# Patient Record
Sex: MALE | Employment: OTHER | URBAN - METROPOLITAN AREA
[De-identification: names, ages, dates, MRNs, and addresses within clinical notes are randomized per-mention and may not be internally consistent; named-entity substitution may affect disease eponyms.]

---

## 2017-12-15 ENCOUNTER — OFFICE VISIT (OUTPATIENT)
Dept: FAMILY MEDICINE CLINIC | Age: 82
End: 2017-12-15

## 2017-12-15 VITALS
RESPIRATION RATE: 18 BRPM | WEIGHT: 241 LBS | HEART RATE: 81 BPM | DIASTOLIC BLOOD PRESSURE: 100 MMHG | HEIGHT: 70 IN | BODY MASS INDEX: 34.5 KG/M2 | OXYGEN SATURATION: 99 % | SYSTOLIC BLOOD PRESSURE: 190 MMHG

## 2017-12-15 DIAGNOSIS — N40.1 BENIGN PROSTATIC HYPERPLASIA WITH LOWER URINARY TRACT SYMPTOMS, SYMPTOM DETAILS UNSPECIFIED: ICD-10-CM

## 2017-12-15 DIAGNOSIS — J44.9 MODERATE COPD (CHRONIC OBSTRUCTIVE PULMONARY DISEASE) (HCC): Primary | ICD-10-CM

## 2017-12-15 DIAGNOSIS — I10 ESSENTIAL HYPERTENSION: ICD-10-CM

## 2017-12-15 DIAGNOSIS — F51.01 PRIMARY INSOMNIA: ICD-10-CM

## 2017-12-15 PROCEDURE — 1123F ACP DISCUSS/DSCN MKR DOCD: CPT | Performed by: FAMILY MEDICINE

## 2017-12-15 PROCEDURE — G8427 DOCREV CUR MEDS BY ELIG CLIN: HCPCS | Performed by: FAMILY MEDICINE

## 2017-12-15 PROCEDURE — G8926 SPIRO NO PERF OR DOC: HCPCS | Performed by: FAMILY MEDICINE

## 2017-12-15 PROCEDURE — 3023F SPIROM DOC REV: CPT | Performed by: FAMILY MEDICINE

## 2017-12-15 PROCEDURE — 99214 OFFICE O/P EST MOD 30 MIN: CPT | Performed by: FAMILY MEDICINE

## 2017-12-15 PROCEDURE — 1036F TOBACCO NON-USER: CPT | Performed by: FAMILY MEDICINE

## 2017-12-15 PROCEDURE — G8417 CALC BMI ABV UP PARAM F/U: HCPCS | Performed by: FAMILY MEDICINE

## 2017-12-15 PROCEDURE — G8484 FLU IMMUNIZE NO ADMIN: HCPCS | Performed by: FAMILY MEDICINE

## 2017-12-15 PROCEDURE — 4040F PNEUMOC VAC/ADMIN/RCVD: CPT | Performed by: FAMILY MEDICINE

## 2017-12-15 RX ORDER — TRAZODONE HYDROCHLORIDE 50 MG/1
50 TABLET ORAL NIGHTLY
Qty: 90 TABLET | Refills: 1 | Status: CANCELLED | OUTPATIENT
Start: 2017-12-15

## 2017-12-15 RX ORDER — FINASTERIDE 5 MG/1
5 TABLET, FILM COATED ORAL DAILY
Qty: 90 TABLET | Refills: 3 | Status: CANCELLED | OUTPATIENT
Start: 2017-12-15

## 2017-12-15 RX ORDER — TRIAMTERENE AND HYDROCHLOROTHIAZIDE 37.5; 25 MG/1; MG/1
1 TABLET ORAL EVERY MORNING
Qty: 90 TABLET | Refills: 3 | Status: CANCELLED | OUTPATIENT
Start: 2017-12-15

## 2017-12-15 RX ORDER — TAMSULOSIN HYDROCHLORIDE 0.4 MG/1
0.8 CAPSULE ORAL NIGHTLY
Qty: 60 CAPSULE | Refills: 5 | Status: SHIPPED | OUTPATIENT
Start: 2017-12-15 | End: 2018-06-25 | Stop reason: SDUPTHER

## 2017-12-15 RX ORDER — FUROSEMIDE 20 MG/1
20 TABLET ORAL 2 TIMES DAILY
Qty: 60 TABLET | Refills: 3 | Status: SHIPPED | OUTPATIENT
Start: 2017-12-15 | End: 2018-06-25 | Stop reason: SDUPTHER

## 2017-12-15 ASSESSMENT — PATIENT HEALTH QUESTIONNAIRE - PHQ9
SUM OF ALL RESPONSES TO PHQ9 QUESTIONS 1 & 2: 1
1. LITTLE INTEREST OR PLEASURE IN DOING THINGS: 0
SUM OF ALL RESPONSES TO PHQ QUESTIONS 1-9: 1
2. FEELING DOWN, DEPRESSED OR HOPELESS: 1

## 2017-12-19 NOTE — PROGRESS NOTES
Diagnosis Assessment and Associated Orders:     1. Moderate COPD (chronic obstructive pulmonary disease) ([address] - suspected moderate to severe. Patient has been off his inhalers  umeclidinium-vilanterol (ANORO ELLIPTA) 62.5-25 MCG/INH AEPB inhaler   2. Benign prostatic hyperplasia with lower urinary tract symptoms, symptom details unspecified  tamsulosin (FLOMAX) 0.4 MG capsule   3. Essential hypertension - elevated due to non compliance with medication and also has white coat hypertension. Currently denies any CNS or cardiac issues. furosemide (LASIX) 20 MG tablet    Comprehensive Metabolic Panel   4. Primary insomnia - suspected to long standing untreated depression that patient has declines treatment in the past. Will discuss at next visit and consider trazodone vs melatonin. All patient questions answered. Pt voiced understanding. Return for 1 week iterpreter for labs, 4 week extended with me HTN BPH, COPD.    ----------------------------------------------------------------------------          Pt is here for:     Chief Complaint   Patient presents with    Arthritis     all over, pain     Other     prostates issues, constant pain    Testicle Pain    Shortness of Breath     always out of breath, increasing worse, chest discomfort stomach up    Insomnia    Discuss Medications     Stop medications     Neck Pain   reprots worsening GERD and flaring of his COPD. States ADvair did not help with his breathing in the past and hasn't seen an providers in the last 2 years. Not checking his blood pressure regularly and ongoing fatigue and occasional headaches The patient denies any symptoms of neurological impairment or TIA's; no amaurosis, diplopia, dysphasia, or unilateral disturbance of motor or sensory function. No loss of balance or vertigo. Ongoing chornic urinary dribbling and had declines urology intervention it the past. Appetite stable. Lives alone.     Does not speak [place]  and gets help Normal appearance and bowel sounds are normal.   Neurological: He  is alert and oriented to person, place, and time. no CN deficits. Skin: He  is not diaphoretic. Psychiatric: He  has a normal mood and affect. MSK: venous stasis changes to both lower shins.   2+ pitting edema     Assessment and Plan:  See above

## 2017-12-22 ENCOUNTER — NURSE ONLY (OUTPATIENT)
Dept: FAMILY MEDICINE CLINIC | Age: 82
End: 2017-12-22

## 2017-12-22 DIAGNOSIS — I10 ESSENTIAL HYPERTENSION: ICD-10-CM

## 2017-12-22 LAB
A/G RATIO: 1.6 (ref 1.1–2.2)
ALBUMIN SERPL-MCNC: 4.3 G/DL (ref 3.4–5)
ALP BLD-CCNC: 67 U/L (ref 40–129)
ALT SERPL-CCNC: 20 U/L (ref 10–40)
ANION GAP SERPL CALCULATED.3IONS-SCNC: 11 MMOL/L (ref 3–16)
AST SERPL-CCNC: 23 U/L (ref 15–37)
BILIRUB SERPL-MCNC: 0.4 MG/DL (ref 0–1)
BUN BLDV-MCNC: 25 MG/DL (ref 7–20)
CALCIUM SERPL-MCNC: 9.5 MG/DL (ref 8.3–10.6)
CHLORIDE BLD-SCNC: 101 MMOL/L (ref 99–110)
CO2: 29 MMOL/L (ref 21–32)
CREAT SERPL-MCNC: 1 MG/DL (ref 0.8–1.3)
GFR AFRICAN AMERICAN: >60
GFR NON-AFRICAN AMERICAN: >60
GLOBULIN: 2.7 G/DL
GLUCOSE BLD-MCNC: 109 MG/DL (ref 70–99)
POTASSIUM SERPL-SCNC: 4.2 MMOL/L (ref 3.5–5.1)
SODIUM BLD-SCNC: 141 MMOL/L (ref 136–145)
TOTAL PROTEIN: 7 G/DL (ref 6.4–8.2)

## 2017-12-22 PROCEDURE — 36415 COLL VENOUS BLD VENIPUNCTURE: CPT | Performed by: FAMILY MEDICINE

## 2017-12-26 ENCOUNTER — TELEPHONE (OUTPATIENT)
Dept: FAMILY MEDICINE CLINIC | Age: 82
End: 2017-12-26

## 2017-12-27 NOTE — TELEPHONE ENCOUNTER
Notify patient's son that his lab work was overall unremarkable with normal kidney function, liver function tests, and electrolytes.

## 2018-01-08 ENCOUNTER — OFFICE VISIT (OUTPATIENT)
Dept: FAMILY MEDICINE CLINIC | Age: 83
End: 2018-01-08

## 2018-01-08 VITALS
WEIGHT: 243.6 LBS | HEIGHT: 70 IN | HEART RATE: 81 BPM | SYSTOLIC BLOOD PRESSURE: 162 MMHG | RESPIRATION RATE: 18 BRPM | BODY MASS INDEX: 34.88 KG/M2 | DIASTOLIC BLOOD PRESSURE: 94 MMHG | OXYGEN SATURATION: 97 %

## 2018-01-08 DIAGNOSIS — N40.1 BENIGN PROSTATIC HYPERPLASIA WITH LOWER URINARY TRACT SYMPTOMS, SYMPTOM DETAILS UNSPECIFIED: ICD-10-CM

## 2018-01-08 DIAGNOSIS — I10 ESSENTIAL HYPERTENSION: ICD-10-CM

## 2018-01-08 DIAGNOSIS — J44.9 MODERATE COPD (CHRONIC OBSTRUCTIVE PULMONARY DISEASE) (HCC): Primary | ICD-10-CM

## 2018-01-08 PROCEDURE — 4040F PNEUMOC VAC/ADMIN/RCVD: CPT | Performed by: FAMILY MEDICINE

## 2018-01-08 PROCEDURE — G8926 SPIRO NO PERF OR DOC: HCPCS | Performed by: FAMILY MEDICINE

## 2018-01-08 PROCEDURE — G8484 FLU IMMUNIZE NO ADMIN: HCPCS | Performed by: FAMILY MEDICINE

## 2018-01-08 PROCEDURE — G8427 DOCREV CUR MEDS BY ELIG CLIN: HCPCS | Performed by: FAMILY MEDICINE

## 2018-01-08 PROCEDURE — 3023F SPIROM DOC REV: CPT | Performed by: FAMILY MEDICINE

## 2018-01-08 PROCEDURE — 1036F TOBACCO NON-USER: CPT | Performed by: FAMILY MEDICINE

## 2018-01-08 PROCEDURE — G8417 CALC BMI ABV UP PARAM F/U: HCPCS | Performed by: FAMILY MEDICINE

## 2018-01-08 PROCEDURE — 99213 OFFICE O/P EST LOW 20 MIN: CPT | Performed by: FAMILY MEDICINE

## 2018-01-08 PROCEDURE — 1123F ACP DISCUSS/DSCN MKR DOCD: CPT | Performed by: FAMILY MEDICINE

## 2018-01-08 RX ORDER — AMLODIPINE BESYLATE 5 MG/1
5 TABLET ORAL DAILY
Qty: 30 TABLET | Refills: 11 | Status: SHIPPED | OUTPATIENT
Start: 2018-01-08 | End: 2018-09-04 | Stop reason: ALTCHOICE

## 2018-01-08 RX ORDER — FINASTERIDE 5 MG/1
5 TABLET, FILM COATED ORAL DAILY
Qty: 30 TABLET | Refills: 11 | Status: SHIPPED | OUTPATIENT
Start: 2018-01-08 | End: 2019-01-10 | Stop reason: SDUPTHER

## 2018-02-14 ENCOUNTER — TELEPHONE (OUTPATIENT)
Dept: FAMILY MEDICINE CLINIC | Age: 83
End: 2018-02-14

## 2018-02-14 DIAGNOSIS — J44.9 MODERATE COPD (CHRONIC OBSTRUCTIVE PULMONARY DISEASE) (HCC): Primary | ICD-10-CM

## 2018-02-14 NOTE — TELEPHONE ENCOUNTER
Spoke to son who states pts breathing has got worse recently even with using inhaler. Pt will schedule appt with [facility] since provider is out of office. Dr Bhavani Baez office will call pt to schedule appt. Pts son is inquiring about what should be done about prostate medication not working with urnation.  Please advise

## 2018-02-20 ENCOUNTER — TELEPHONE (OUTPATIENT)
Dept: FAMILY MEDICINE CLINIC | Age: 83
End: 2018-02-20

## 2018-02-28 ENCOUNTER — HOSPITAL ENCOUNTER (OUTPATIENT)
Dept: GENERAL RADIOLOGY | Age: 83
Discharge: HOME | End: 2018-02-28
Attending: INTERNAL MEDICINE | Admitting: INTERNAL MEDICINE

## 2018-02-28 ENCOUNTER — INITIAL CONSULT (OUTPATIENT)
Dept: PULMONOLOGY | Age: 83
End: 2018-02-28

## 2018-02-28 VITALS
WEIGHT: 243 LBS | HEIGHT: 72 IN | OXYGEN SATURATION: 94 % | SYSTOLIC BLOOD PRESSURE: 140 MMHG | BODY MASS INDEX: 32.91 KG/M2 | HEART RATE: 86 BPM | DIASTOLIC BLOOD PRESSURE: 78 MMHG

## 2018-02-28 DIAGNOSIS — J44.9 COPD, MILD (HCC): ICD-10-CM

## 2018-02-28 DIAGNOSIS — Z87.891 FORMER SMOKER: ICD-10-CM

## 2018-02-28 LAB
DLCO %PRED: NORMAL
DLCO PRE: NORMAL
FEF 25-75%-POST: 1.42
FEF 25-75%-PRE: 1.69
FEV1-POST: 1.99
FEV1-PRE: 2.25
FEV1/FVC-POST: 61.3
FEV1/FVC-PRE: 69.3
FVC-POST: 3.25
FVC-PRE: 3.25
MEP: NORMAL
MIP: NORMAL
TLC %PRED: NORMAL
TLC PRE: NORMAL

## 2018-02-28 PROCEDURE — 1123F ACP DISCUSS/DSCN MKR DOCD: CPT | Performed by: INTERNAL MEDICINE

## 2018-02-28 PROCEDURE — G8427 DOCREV CUR MEDS BY ELIG CLIN: HCPCS | Performed by: INTERNAL MEDICINE

## 2018-02-28 PROCEDURE — G8484 FLU IMMUNIZE NO ADMIN: HCPCS | Performed by: INTERNAL MEDICINE

## 2018-02-28 PROCEDURE — 3023F SPIROM DOC REV: CPT | Performed by: INTERNAL MEDICINE

## 2018-02-28 PROCEDURE — 1036F TOBACCO NON-USER: CPT | Performed by: INTERNAL MEDICINE

## 2018-02-28 PROCEDURE — 99203 OFFICE O/P NEW LOW 30 MIN: CPT | Performed by: INTERNAL MEDICINE

## 2018-02-28 PROCEDURE — G8926 SPIRO NO PERF OR DOC: HCPCS | Performed by: INTERNAL MEDICINE

## 2018-02-28 PROCEDURE — 4040F PNEUMOC VAC/ADMIN/RCVD: CPT | Performed by: INTERNAL MEDICINE

## 2018-02-28 PROCEDURE — G8417 CALC BMI ABV UP PARAM F/U: HCPCS | Performed by: INTERNAL MEDICINE

## 2018-02-28 ASSESSMENT — PULMONARY FUNCTION TESTS
FVC_POST: 3.25
FEV1/FVC_PRE: 69.3
FEV1_POST: 1.99
FEV1/FVC_POST: 61.3
FEV1_PRE: 2.25
FVC_PRE: 3.25

## 2018-02-28 NOTE — PROGRESS NOTES
Subjective:   CHIEF COMPLAINT / HPI: Miguel Mccarthy is a 36-year-old male, [place]-speaking and originally from [facility], who presents with a history of COPD and shortness of breath with exertion. According to his son this is effecting his sleep and the current bronchodilator he is using is ineffective. Using an  Mr. Mendez Gutierrez denies marked shortness of breath and states that he can walk to the parking lot without being short of breath and can walk a flight of stairs. He does complain of fatigue after exertion but not shortness of breath. He was a previous a 2 pack-a-day smoker, who states that he smoked 65 years and quit almost 10 years ago. He does not give a history of recurrent episodes of bronchitis but does cough and expectorate a small amount of sputum daily. He denies chest pain and denies a history of heart disease. He does complain of insomnia and awakening frequently at nighttime but does not have a history of witnessed apnea, awakening snorting and gasping for air or excessive daytime sleepiness. He does complain of pain all over. He states that he can sneeze up to 30 times a day but does not have a history of allergic rhinitis and denies any allergies. He is not aware of a family history of chronic lung disease, asthma or lung cancer.          Past Medical History:  Past Medical History:   Diagnosis Date    BPH     Mod BPH 40 gm prostate- Cystourethroscopy 4/2010- Dr. Monse Key COPD (chronic obstructive pulmonary disease) ([facility]     PFT 10/09 3.21 (98%) / 4.97 (115%)=0.65    COPD, mild ([address] 2/28/2018    Depression     Pt has refused Rx in the past    DJD (degenerative joint disease), lumbar     CT scan 3/29/2010    Former smoker 2/28/2018    Hypertension 1996    Insomnia     Kidney stones     CT scan 3/29/2010    Liver cyst     CT scan 3/29/2010    Lumbar disc disease 8/2012    Dr. Nicola Perkins, will refer to Dr. Hanh Alfonso for eval    Lumbar stenosis     CT scan 3/29/2010   Elie Angel Nocturia     Orchitis and epididymitis, unspecified 8/2012    Dr. Leighann Bland    Pain in left testicle     Pancreatic cyst     CT scan 3/29/2010    Spinal stenosis 8/2012    severe-Dr. Berhane Yan Suicide attempt 3/2005    drug overdose with  Zestoretic    Tachycardia     \"saw Dr , not sure of his name, did tests and did not find anything\"\"that was about 4 months ago\"\"no more episodes since had work up\"    Ulcer ([address]     hx of ulcers\"three years ago but ok now\"(pc)       Current Medications:    No current facility-administered medications for this visit. Allergies   Allergen Reactions    Ace Inhibitors      Angioedema, 8/05/ (on 9/26/12- pt denies any allergic reaction to any medications)       Social History:    Social History     Social History    Marital status: Legally      Spouse name: N/A    Number of children: N/A    Years of education: N/A     Social History Main Topics    Smoking status: Former Smoker     Packs/day: 2.50     Years: 65.00     Quit date: 8/22/2009    Smokeless tobacco: Never Used      Comment: Pt smoked for 65 yrs. Quit 9 yrs ago. Updated 2/28/18    Alcohol use 2.5 oz/week     5 Standard drinks or equivalent per week    Drug use: Unknown    Sexual activity: Not Asked     Other Topics Concern    None     Social History Narrative    None       Family History:    History reviewed. No pertinent family history. REVIEW OF SYSTEMS:    CONSTITUTIONAL:  negative for fevers, chills, diaphoresis, activity change, appetite change, night sweats and unexpected weight change.    HEENT:  negative for hearing loss,  sinus pressure,  epistaxis and snoring  RESPIRATORY:  See HPI  CARDIOVASCULAR:  Negative for chest pain, palpitations, exertional chest pressure/discomfort, edema, syncope  GASTROINTESTINAL: negative for nausea, vomiting, diarrhea, constipation, blood in stool and abdominal pain  GENITOURINARY:  negative for  dysuria and hematuria  HEMATOLOGIC/LYMPHATIC:  negative for easy bruising, bleeding and lymphadenopathy  ALLERGIC/IMMUNOLOGIC:  negative for recurrent infections, angioedema, anaphylaxis and drug reaction  MUSCULOSKELETAL:  negative  joint swelling, decreased range of motion and muscle weakness    Objective:   PHYSICAL EXAM:      VITALS:    Vitals:    02/28/18 1040   BP: (!) 140/78   Site: Right Arm   Pulse: 86   SpO2: 94%   Weight: 243 lb (110.2 kg)   Height: 6' (1.829 m)         CONSTITUTIONAL:  awake, alert, cooperative, no apparent distress, and appears younger than  stated age  NECK:  Supple and nontender,  trachea midline, no adenopathy, thyroid nl, no JVD, no wheezing or stridor over neck  Nasal membranes appear to be normal without purulent discharge or injection or edema  Oral pharynx was clear  CHEST: Chest expansion equal and symmetrical, no intercostal retraction, no increased work of breathing  LUNGS: Breath sounds are clear throughout. I hear no wheezes rales or rhonchi   CARDIOVASCULAR: Normal S1 and S2 , no murmurs or gallops ,no pericardial rubs  ABDOMEN:  normal bowel sounds, non-distended and no masses palpated, and no tenderness to palpation. No hepatospleenomegaly  LYMPHADENOPATHY:  no axillary or supraclavicular adenopathy. No cervical adnenopathy  RIGHT AND LEFT LOWER EXTREMITIES: No edema, no inflammation, no tenderness. RADIOLOGY: No recent chest x-ray available    PFT: Spirometry completed at [facility] on 3/19/15 demonstrated a minimal obstructive defect with a significant an almost asthmatic like response to bronchodilators  Office spirometry today demonstrates a mild obstructive defect with no significant response to bronchodilators    Assessment:     1. Irish speaking patient- requires proper     2. COPD, mild ([address]    3. Former smoker        Plan:   I would like to obtain a more recent chest x-ray. I don't have a good answer for his chronic pain. He appears to have only a mild obstructive defect.   Because he has a history of sneezing and possible allergies along with a marked improvement with bronchodilators in 2015 I would like to switch him from Anoro to Breo 100/25 one inhalation daily and I will teach him how to use a pro-air respiclick 2 inhalations every 4-6 hours as necessary especially if he becomes more short of breath or have any wheezing. I also will place him on nasal steroid for his recurrent sneezing and nasal congestion. All in all he has well preserved lung function after his long history of tobacco use. I will see him back in 6 weeks and follow-up. I have given him samples of Breo and pro-air  Return in about 6 weeks (around 4/11/2018) for Recheck for COPD. This dictation was performed with a verbal recognition program and it was checked for errors. It is possible that there are still dictated errors within this office note. Any errors should be brought immediately to my attention for correction. All efforts were made to ensure that this office note is accurate.

## 2018-03-09 DIAGNOSIS — J44.9 COPD, MILD (HCC): ICD-10-CM

## 2018-03-09 DIAGNOSIS — Z87.891 FORMER SMOKER: ICD-10-CM

## 2018-03-15 ENCOUNTER — TELEPHONE (OUTPATIENT)
Dept: PULMONOLOGY | Age: 83
End: 2018-03-15

## 2018-03-16 ENCOUNTER — TELEPHONE (OUTPATIENT)
Dept: PULMONOLOGY | Age: 83
End: 2018-03-16

## 2018-03-21 ENCOUNTER — TELEPHONE (OUTPATIENT)
Dept: PULMONOLOGY | Age: 83
End: 2018-03-21

## 2018-03-21 DIAGNOSIS — R06.02 SHORTNESS OF BREATH: Primary | ICD-10-CM

## 2018-04-03 ENCOUNTER — OFFICE VISIT (OUTPATIENT)
Dept: FAMILY MEDICINE CLINIC | Age: 83
End: 2018-04-03

## 2018-04-03 VITALS
DIASTOLIC BLOOD PRESSURE: 68 MMHG | OXYGEN SATURATION: 97 % | HEART RATE: 87 BPM | SYSTOLIC BLOOD PRESSURE: 116 MMHG | BODY MASS INDEX: 33.31 KG/M2 | WEIGHT: 245.6 LBS

## 2018-04-03 DIAGNOSIS — J44.9 COPD, MILD (HCC): Primary | ICD-10-CM

## 2018-04-03 DIAGNOSIS — R06.02 SOB (SHORTNESS OF BREATH): ICD-10-CM

## 2018-04-03 DIAGNOSIS — G89.29 OTHER CHRONIC PAIN: ICD-10-CM

## 2018-04-03 DIAGNOSIS — I10 ESSENTIAL HYPERTENSION: ICD-10-CM

## 2018-04-03 DIAGNOSIS — Z23 NEED FOR PNEUMOCOCCAL VACCINATION: ICD-10-CM

## 2018-04-03 LAB
ANION GAP SERPL CALCULATED.3IONS-SCNC: 15 MMOL/L (ref 3–16)
BUN BLDV-MCNC: 24 MG/DL (ref 7–20)
CALCIUM SERPL-MCNC: 9.5 MG/DL (ref 8.3–10.6)
CHLORIDE BLD-SCNC: 97 MMOL/L (ref 99–110)
CO2: 28 MMOL/L (ref 21–32)
CREAT SERPL-MCNC: 1.1 MG/DL (ref 0.8–1.3)
D DIMER: <200 NG/ML DDU (ref 0–229)
GFR AFRICAN AMERICAN: >60
GFR NON-AFRICAN AMERICAN: >60
GLUCOSE BLD-MCNC: 77 MG/DL (ref 70–99)
HCT VFR BLD CALC: 46.3 % (ref 40.5–52.5)
HEMOGLOBIN: 15.7 G/DL (ref 13.5–17.5)
MCH RBC QN AUTO: 32.2 PG (ref 26–34)
MCHC RBC AUTO-ENTMCNC: 34 G/DL (ref 31–36)
MCV RBC AUTO: 94.9 FL (ref 80–100)
PDW BLD-RTO: 13.6 % (ref 12.4–15.4)
PLATELET # BLD: 256 K/UL (ref 135–450)
PMV BLD AUTO: 9.2 FL (ref 5–10.5)
POTASSIUM SERPL-SCNC: 5.1 MMOL/L (ref 3.5–5.1)
PRO-BNP: 218 PG/ML (ref 0–449)
RBC # BLD: 4.88 M/UL (ref 4.2–5.9)
SODIUM BLD-SCNC: 140 MMOL/L (ref 136–145)
TSH SERPL DL<=0.05 MIU/L-ACNC: 5.27 UIU/ML (ref 0.27–4.2)
WBC # BLD: 8.2 K/UL (ref 4–11)

## 2018-04-03 PROCEDURE — G8926 SPIRO NO PERF OR DOC: HCPCS | Performed by: FAMILY MEDICINE

## 2018-04-03 PROCEDURE — 4040F PNEUMOC VAC/ADMIN/RCVD: CPT | Performed by: FAMILY MEDICINE

## 2018-04-03 PROCEDURE — G8427 DOCREV CUR MEDS BY ELIG CLIN: HCPCS | Performed by: FAMILY MEDICINE

## 2018-04-03 PROCEDURE — 1036F TOBACCO NON-USER: CPT | Performed by: FAMILY MEDICINE

## 2018-04-03 PROCEDURE — G8417 CALC BMI ABV UP PARAM F/U: HCPCS | Performed by: FAMILY MEDICINE

## 2018-04-03 PROCEDURE — 3023F SPIROM DOC REV: CPT | Performed by: FAMILY MEDICINE

## 2018-04-03 PROCEDURE — 1123F ACP DISCUSS/DSCN MKR DOCD: CPT | Performed by: FAMILY MEDICINE

## 2018-04-03 PROCEDURE — 99214 OFFICE O/P EST MOD 30 MIN: CPT | Performed by: FAMILY MEDICINE

## 2018-04-03 PROCEDURE — G0009 ADMIN PNEUMOCOCCAL VACCINE: HCPCS | Performed by: FAMILY MEDICINE

## 2018-04-03 PROCEDURE — 90670 PCV13 VACCINE IM: CPT | Performed by: FAMILY MEDICINE

## 2018-04-03 PROCEDURE — 36415 COLL VENOUS BLD VENIPUNCTURE: CPT | Performed by: FAMILY MEDICINE

## 2018-04-03 RX ORDER — DULOXETIN HYDROCHLORIDE 20 MG/1
20 CAPSULE, DELAYED RELEASE ORAL NIGHTLY
Qty: 90 CAPSULE | Refills: 1 | Status: SHIPPED | OUTPATIENT
Start: 2018-04-03 | End: 2018-09-04 | Stop reason: ALTCHOICE

## 2018-04-06 PROBLEM — I51.9 MILD DIASTOLIC DYSFUNCTION: Status: ACTIVE | Noted: 2018-04-06

## 2018-04-10 ENCOUNTER — OFFICE VISIT (OUTPATIENT)
Dept: PULMONOLOGY | Age: 83
End: 2018-04-10

## 2018-04-10 ENCOUNTER — TELEPHONE (OUTPATIENT)
Dept: ORTHOPEDIC SURGERY | Age: 83
End: 2018-04-10

## 2018-04-10 VITALS
OXYGEN SATURATION: 86 % | DIASTOLIC BLOOD PRESSURE: 68 MMHG | WEIGHT: 246 LBS | RESPIRATION RATE: 16 BRPM | BODY MASS INDEX: 33.36 KG/M2 | HEART RATE: 79 BPM | SYSTOLIC BLOOD PRESSURE: 126 MMHG

## 2018-04-10 DIAGNOSIS — R06.09 DYSPNEA ON EXERTION: ICD-10-CM

## 2018-04-10 DIAGNOSIS — Z87.891 FORMER SMOKER: ICD-10-CM

## 2018-04-10 DIAGNOSIS — J44.9 COPD, MILD (HCC): Primary | ICD-10-CM

## 2018-04-10 PROCEDURE — G8427 DOCREV CUR MEDS BY ELIG CLIN: HCPCS | Performed by: INTERNAL MEDICINE

## 2018-04-10 PROCEDURE — 99213 OFFICE O/P EST LOW 20 MIN: CPT | Performed by: INTERNAL MEDICINE

## 2018-04-10 PROCEDURE — 4040F PNEUMOC VAC/ADMIN/RCVD: CPT | Performed by: INTERNAL MEDICINE

## 2018-04-10 PROCEDURE — G8926 SPIRO NO PERF OR DOC: HCPCS | Performed by: INTERNAL MEDICINE

## 2018-04-10 PROCEDURE — 1123F ACP DISCUSS/DSCN MKR DOCD: CPT | Performed by: INTERNAL MEDICINE

## 2018-04-10 PROCEDURE — 1036F TOBACCO NON-USER: CPT | Performed by: INTERNAL MEDICINE

## 2018-04-10 PROCEDURE — G8417 CALC BMI ABV UP PARAM F/U: HCPCS | Performed by: INTERNAL MEDICINE

## 2018-04-10 PROCEDURE — 3023F SPIROM DOC REV: CPT | Performed by: INTERNAL MEDICINE

## 2018-04-24 ENCOUNTER — TELEPHONE (OUTPATIENT)
Dept: FAMILY MEDICINE CLINIC | Age: 83
End: 2018-04-24

## 2018-04-24 DIAGNOSIS — R06.09 DOE (DYSPNEA ON EXERTION): Primary | ICD-10-CM

## 2018-04-24 DIAGNOSIS — I51.9 MILD DIASTOLIC DYSFUNCTION: ICD-10-CM

## 2018-04-24 DIAGNOSIS — I10 ESSENTIAL HYPERTENSION: ICD-10-CM

## 2018-04-26 ENCOUNTER — TELEPHONE (OUTPATIENT)
Dept: FAMILY MEDICINE CLINIC | Age: 83
End: 2018-04-26

## 2018-04-30 ENCOUNTER — INITIAL CONSULT (OUTPATIENT)
Dept: CARDIOLOGY CLINIC | Age: 83
End: 2018-04-30

## 2018-04-30 VITALS
BODY MASS INDEX: 33.08 KG/M2 | HEIGHT: 72 IN | HEART RATE: 89 BPM | WEIGHT: 244.2 LBS | SYSTOLIC BLOOD PRESSURE: 130 MMHG | DIASTOLIC BLOOD PRESSURE: 84 MMHG

## 2018-04-30 DIAGNOSIS — R06.09 DYSPNEA ON EXERTION: ICD-10-CM

## 2018-04-30 DIAGNOSIS — Z87.891 FORMER SMOKER: ICD-10-CM

## 2018-04-30 DIAGNOSIS — E78.5 DYSLIPIDEMIA: ICD-10-CM

## 2018-04-30 DIAGNOSIS — R07.9 CHEST PAIN, UNSPECIFIED TYPE: Primary | ICD-10-CM

## 2018-04-30 DIAGNOSIS — I10 ESSENTIAL HYPERTENSION: ICD-10-CM

## 2018-04-30 DIAGNOSIS — R07.2 PRECORDIAL PAIN: ICD-10-CM

## 2018-04-30 PROCEDURE — G8427 DOCREV CUR MEDS BY ELIG CLIN: HCPCS | Performed by: INTERNAL MEDICINE

## 2018-04-30 PROCEDURE — G8417 CALC BMI ABV UP PARAM F/U: HCPCS | Performed by: INTERNAL MEDICINE

## 2018-04-30 PROCEDURE — 99204 OFFICE O/P NEW MOD 45 MIN: CPT | Performed by: INTERNAL MEDICINE

## 2018-04-30 PROCEDURE — 93000 ELECTROCARDIOGRAM COMPLETE: CPT | Performed by: INTERNAL MEDICINE

## 2018-05-08 ENCOUNTER — OFFICE VISIT (OUTPATIENT)
Dept: FAMILY MEDICINE CLINIC | Age: 83
End: 2018-05-08

## 2018-05-08 VITALS
HEART RATE: 89 BPM | WEIGHT: 248.8 LBS | SYSTOLIC BLOOD PRESSURE: 138 MMHG | OXYGEN SATURATION: 96 % | BODY MASS INDEX: 33.74 KG/M2 | DIASTOLIC BLOOD PRESSURE: 64 MMHG

## 2018-05-08 DIAGNOSIS — J44.9 COPD, MILD (HCC): ICD-10-CM

## 2018-05-08 DIAGNOSIS — R07.2 PRECORDIAL PAIN: Primary | ICD-10-CM

## 2018-05-08 PROCEDURE — 1036F TOBACCO NON-USER: CPT | Performed by: FAMILY MEDICINE

## 2018-05-08 PROCEDURE — 4040F PNEUMOC VAC/ADMIN/RCVD: CPT | Performed by: FAMILY MEDICINE

## 2018-05-08 PROCEDURE — 3023F SPIROM DOC REV: CPT | Performed by: FAMILY MEDICINE

## 2018-05-08 PROCEDURE — 1123F ACP DISCUSS/DSCN MKR DOCD: CPT | Performed by: FAMILY MEDICINE

## 2018-05-08 PROCEDURE — G8926 SPIRO NO PERF OR DOC: HCPCS | Performed by: FAMILY MEDICINE

## 2018-05-08 PROCEDURE — 99213 OFFICE O/P EST LOW 20 MIN: CPT | Performed by: FAMILY MEDICINE

## 2018-05-08 PROCEDURE — G8427 DOCREV CUR MEDS BY ELIG CLIN: HCPCS | Performed by: FAMILY MEDICINE

## 2018-05-08 PROCEDURE — G8417 CALC BMI ABV UP PARAM F/U: HCPCS | Performed by: FAMILY MEDICINE

## 2018-05-08 RX ORDER — GUAIFENESIN 600 MG/1
600 TABLET, EXTENDED RELEASE ORAL 2 TIMES DAILY
Qty: 60 TABLET | Refills: 1 | Status: SHIPPED | OUTPATIENT
Start: 2018-05-08 | End: 2018-07-23 | Stop reason: SDUPTHER

## 2018-05-08 RX ORDER — OMEPRAZOLE 40 MG/1
40 CAPSULE, DELAYED RELEASE ORAL EVERY MORNING
Qty: 90 CAPSULE | Refills: 1 | Status: SHIPPED | OUTPATIENT
Start: 2018-05-08 | End: 2018-10-06 | Stop reason: SDUPTHER

## 2018-05-11 ENCOUNTER — PROCEDURE VISIT (OUTPATIENT)
Dept: CARDIOLOGY CLINIC | Age: 83
End: 2018-05-11

## 2018-05-11 DIAGNOSIS — E78.5 DYSLIPIDEMIA: ICD-10-CM

## 2018-05-11 DIAGNOSIS — R06.09 DYSPNEA ON EXERTION: ICD-10-CM

## 2018-05-11 DIAGNOSIS — R07.9 CHEST PAIN, UNSPECIFIED TYPE: ICD-10-CM

## 2018-05-11 DIAGNOSIS — R07.2 PRECORDIAL PAIN: ICD-10-CM

## 2018-05-11 DIAGNOSIS — R06.09 DYSPNEA ON EXERTION: Primary | ICD-10-CM

## 2018-05-11 LAB
LV EF: 52 %
LV EF: 55 %
LVEF MODALITY: NORMAL
LVEF MODALITY: NORMAL

## 2018-05-11 PROCEDURE — 93306 TTE W/DOPPLER COMPLETE: CPT | Performed by: INTERNAL MEDICINE

## 2018-05-11 PROCEDURE — 93016 CV STRESS TEST SUPVJ ONLY: CPT | Performed by: INTERNAL MEDICINE

## 2018-05-11 PROCEDURE — 93017 CV STRESS TEST TRACING ONLY: CPT | Performed by: INTERNAL MEDICINE

## 2018-05-11 PROCEDURE — A9500 TC99M SESTAMIBI: HCPCS | Performed by: INTERNAL MEDICINE

## 2018-05-11 PROCEDURE — 78452 HT MUSCLE IMAGE SPECT MULT: CPT | Performed by: INTERNAL MEDICINE

## 2018-05-11 PROCEDURE — 93018 CV STRESS TEST I&R ONLY: CPT | Performed by: INTERNAL MEDICINE

## 2018-05-15 ENCOUNTER — TELEPHONE (OUTPATIENT)
Dept: CARDIOLOGY CLINIC | Age: 83
End: 2018-05-15

## 2018-05-23 ENCOUNTER — OFFICE VISIT (OUTPATIENT)
Dept: CARDIOLOGY CLINIC | Age: 83
End: 2018-05-23

## 2018-05-23 VITALS
HEIGHT: 72 IN | WEIGHT: 247.6 LBS | BODY MASS INDEX: 33.54 KG/M2 | HEART RATE: 92 BPM | SYSTOLIC BLOOD PRESSURE: 180 MMHG | DIASTOLIC BLOOD PRESSURE: 96 MMHG

## 2018-05-23 DIAGNOSIS — I71.20 THORACIC AORTIC ANEURYSM WITHOUT RUPTURE: ICD-10-CM

## 2018-05-23 DIAGNOSIS — R07.2 PRECORDIAL PAIN: Primary | ICD-10-CM

## 2018-05-23 DIAGNOSIS — E78.5 DYSLIPIDEMIA: ICD-10-CM

## 2018-05-23 DIAGNOSIS — I10 ESSENTIAL HYPERTENSION: ICD-10-CM

## 2018-05-23 PROCEDURE — 4040F PNEUMOC VAC/ADMIN/RCVD: CPT | Performed by: INTERNAL MEDICINE

## 2018-05-23 PROCEDURE — 1123F ACP DISCUSS/DSCN MKR DOCD: CPT | Performed by: INTERNAL MEDICINE

## 2018-05-23 PROCEDURE — G8417 CALC BMI ABV UP PARAM F/U: HCPCS | Performed by: INTERNAL MEDICINE

## 2018-05-23 PROCEDURE — G8427 DOCREV CUR MEDS BY ELIG CLIN: HCPCS | Performed by: INTERNAL MEDICINE

## 2018-05-23 PROCEDURE — 99214 OFFICE O/P EST MOD 30 MIN: CPT | Performed by: INTERNAL MEDICINE

## 2018-05-23 PROCEDURE — 1036F TOBACCO NON-USER: CPT | Performed by: INTERNAL MEDICINE

## 2018-05-23 RX ORDER — NITROGLYCERIN 0.4 MG/1
0.4 TABLET SUBLINGUAL EVERY 5 MIN PRN
Qty: 25 TABLET | Refills: 3 | Status: SHIPPED | OUTPATIENT
Start: 2018-05-23 | End: 2018-07-23 | Stop reason: SDUPTHER

## 2018-05-23 RX ORDER — CARVEDILOL 6.25 MG/1
6.25 TABLET ORAL 2 TIMES DAILY WITH MEALS
Qty: 60 TABLET | Refills: 3 | Status: SHIPPED | OUTPATIENT
Start: 2018-05-23 | End: 2018-07-15

## 2018-05-24 ENCOUNTER — HOSPITAL ENCOUNTER (OUTPATIENT)
Dept: CT IMAGING | Age: 83
Discharge: HOME | End: 2018-05-24
Attending: FAMILY MEDICINE | Admitting: FAMILY MEDICINE

## 2018-05-24 DIAGNOSIS — R06.09 OTHER FORMS OF DYSPNEA: ICD-10-CM

## 2018-05-24 DIAGNOSIS — R06.09 DOE (DYSPNEA ON EXERTION): ICD-10-CM

## 2018-05-24 LAB
GFR AFRICAN AMERICAN: >60 ML/MIN/1.73M2
GFR NON-AFRICAN AMERICAN: >60 ML/MIN/1.73M2
POC CREATININE: 1 MG/DL (ref 0.9–1.3)

## 2018-05-24 RX ORDER — SODIUM CHLORIDE 0.9 % (FLUSH) 0.9 %
10 SYRINGE (ML) INJECTION ONCE
Status: COMPLETED | OUTPATIENT
Start: 2018-05-24 | End: 2018-05-24

## 2018-05-24 RX ADMIN — Medication 10 ML: at 12:27

## 2018-05-25 ENCOUNTER — TELEPHONE (OUTPATIENT)
Dept: FAMILY MEDICINE CLINIC | Age: 83
End: 2018-05-25

## 2018-06-05 PROBLEM — Z92.89 HX OF CARDIOVASCULAR STRESS TEST: Status: ACTIVE | Noted: 2018-05-11

## 2018-06-08 ENCOUNTER — TELEPHONE (OUTPATIENT)
Dept: CARDIOLOGY CLINIC | Age: 83
End: 2018-06-08

## 2018-06-25 ENCOUNTER — OFFICE VISIT (OUTPATIENT)
Dept: FAMILY MEDICINE CLINIC | Age: 83
End: 2018-06-25

## 2018-06-25 VITALS
DIASTOLIC BLOOD PRESSURE: 88 MMHG | WEIGHT: 247.6 LBS | OXYGEN SATURATION: 97 % | HEART RATE: 92 BPM | SYSTOLIC BLOOD PRESSURE: 148 MMHG | BODY MASS INDEX: 33.58 KG/M2

## 2018-06-25 DIAGNOSIS — N40.1 BENIGN PROSTATIC HYPERPLASIA WITH LOWER URINARY TRACT SYMPTOMS, SYMPTOM DETAILS UNSPECIFIED: ICD-10-CM

## 2018-06-25 DIAGNOSIS — I10 ESSENTIAL HYPERTENSION: ICD-10-CM

## 2018-06-25 DIAGNOSIS — N28.1 RENAL CYST, RIGHT: ICD-10-CM

## 2018-06-25 DIAGNOSIS — R94.39 ABNORMAL CARDIOVASCULAR STRESS TEST: ICD-10-CM

## 2018-06-25 DIAGNOSIS — J44.9 COPD, MILD (HCC): Primary | ICD-10-CM

## 2018-06-25 DIAGNOSIS — R31.9 HEMATURIA, UNSPECIFIED TYPE: ICD-10-CM

## 2018-06-25 PROCEDURE — 1036F TOBACCO NON-USER: CPT | Performed by: FAMILY MEDICINE

## 2018-06-25 PROCEDURE — G8427 DOCREV CUR MEDS BY ELIG CLIN: HCPCS | Performed by: FAMILY MEDICINE

## 2018-06-25 PROCEDURE — G8417 CALC BMI ABV UP PARAM F/U: HCPCS | Performed by: FAMILY MEDICINE

## 2018-06-25 PROCEDURE — 99214 OFFICE O/P EST MOD 30 MIN: CPT | Performed by: FAMILY MEDICINE

## 2018-06-25 PROCEDURE — 3023F SPIROM DOC REV: CPT | Performed by: FAMILY MEDICINE

## 2018-06-25 PROCEDURE — 1123F ACP DISCUSS/DSCN MKR DOCD: CPT | Performed by: FAMILY MEDICINE

## 2018-06-25 PROCEDURE — G8926 SPIRO NO PERF OR DOC: HCPCS | Performed by: FAMILY MEDICINE

## 2018-06-25 PROCEDURE — 4040F PNEUMOC VAC/ADMIN/RCVD: CPT | Performed by: FAMILY MEDICINE

## 2018-06-25 RX ORDER — FUROSEMIDE 20 MG/1
20 TABLET ORAL 2 TIMES DAILY
Qty: 60 TABLET | Refills: 5 | Status: SHIPPED | OUTPATIENT
Start: 2018-06-25 | End: 2018-11-06 | Stop reason: SDUPTHER

## 2018-06-25 RX ORDER — TAMSULOSIN HYDROCHLORIDE 0.4 MG/1
0.8 CAPSULE ORAL NIGHTLY
Qty: 60 CAPSULE | Refills: 5 | Status: SHIPPED | OUTPATIENT
Start: 2018-06-25 | End: 2018-11-06 | Stop reason: SDUPTHER

## 2018-06-26 ENCOUNTER — TELEPHONE (OUTPATIENT)
Dept: CARDIOLOGY CLINIC | Age: 83
End: 2018-06-26

## 2018-06-27 ENCOUNTER — TELEPHONE (OUTPATIENT)
Dept: CARDIOLOGY CLINIC | Age: 83
End: 2018-06-27

## 2018-06-29 ENCOUNTER — TELEPHONE (OUTPATIENT)
Dept: CARDIOLOGY CLINIC | Age: 83
End: 2018-06-29

## 2018-07-11 ENCOUNTER — TELEPHONE (OUTPATIENT)
Dept: CARDIOLOGY CLINIC | Age: 83
End: 2018-07-11

## 2018-07-13 ENCOUNTER — TELEPHONE (OUTPATIENT)
Dept: CARDIOLOGY CLINIC | Age: 83
End: 2018-07-13

## 2018-07-13 NOTE — TELEPHONE ENCOUNTER
Son called very concerned about father he is to have a heart cath 7/19   He spoke to his dad today he has been having chest pain   Having a hard time getting around, Dad speaks Latvian   he doesn't think he would be able to speak to our office   Son Is calling and advising him to go to the ER

## 2018-07-15 PROBLEM — I20.0 UNSTABLE ANGINA (HCC): Status: ACTIVE | Noted: 2018-07-15

## 2018-07-16 NOTE — TELEPHONE ENCOUNTER
Patient was advised by Radha Colindres that since the patient is having Chest pain and is scheduled for a cath for the patient to go tot the ED. Staff is not able tot speak to the patient directly since he does not speak any [place].

## 2018-07-17 PROBLEM — I25.110 CORONARY ARTERY DISEASE INVOLVING NATIVE CORONARY ARTERY OF NATIVE HEART WITH UNSTABLE ANGINA PECTORIS (HCC): Status: ACTIVE | Noted: 2018-04-10

## 2018-07-18 ENCOUNTER — TELEPHONE (OUTPATIENT)
Dept: FAMILY MEDICINE CLINIC | Age: 83
End: 2018-07-18

## 2018-07-19 ENCOUNTER — TELEPHONE (OUTPATIENT)
Dept: FAMILY MEDICINE CLINIC | Age: 83
End: 2018-07-19

## 2018-07-23 ENCOUNTER — TELEPHONE (OUTPATIENT)
Dept: FAMILY MEDICINE CLINIC | Age: 83
End: 2018-07-23

## 2018-07-23 DIAGNOSIS — J44.9 COPD, MILD (HCC): ICD-10-CM

## 2018-07-23 DIAGNOSIS — I25.110 CORONARY ARTERY DISEASE INVOLVING NATIVE CORONARY ARTERY OF NATIVE HEART WITH UNSTABLE ANGINA PECTORIS (HCC): Primary | ICD-10-CM

## 2018-07-23 PROBLEM — I20.0 UNSTABLE ANGINA (HCC): Status: RESOLVED | Noted: 2018-07-15 | Resolved: 2018-07-23

## 2018-07-23 RX ORDER — AZITHROMYCIN 250 MG/1
TABLET, FILM COATED ORAL
Qty: 6 TABLET | Refills: 0 | Status: SHIPPED | OUTPATIENT
Start: 2018-07-23 | End: 2018-08-02

## 2018-07-23 RX ORDER — GUAIFENESIN 600 MG/1
600 TABLET, EXTENDED RELEASE ORAL 2 TIMES DAILY
Qty: 60 TABLET | Refills: 1 | Status: SHIPPED | OUTPATIENT
Start: 2018-07-23 | End: 2018-08-06 | Stop reason: ALTCHOICE

## 2018-07-23 RX ORDER — NITROGLYCERIN 0.4 MG/1
0.4 TABLET SUBLINGUAL EVERY 5 MIN PRN
Qty: 25 TABLET | Refills: 3 | Status: SHIPPED | OUTPATIENT
Start: 2018-07-23

## 2018-07-23 NOTE — TELEPHONE ENCOUNTER
Please notify home care I also included for his to start zpack anbiotics due to his COPD and recent hospitalization. Nitrostat and Mucinex also refilled.

## 2018-07-31 ENCOUNTER — TELEPHONE (OUTPATIENT)
Dept: CARDIOLOGY CLINIC | Age: 83
End: 2018-07-31

## 2018-07-31 ENCOUNTER — OFFICE VISIT (OUTPATIENT)
Dept: CARDIOLOGY CLINIC | Age: 83
End: 2018-07-31

## 2018-07-31 VITALS
BODY MASS INDEX: 33.18 KG/M2 | WEIGHT: 245 LBS | DIASTOLIC BLOOD PRESSURE: 74 MMHG | SYSTOLIC BLOOD PRESSURE: 122 MMHG | HEART RATE: 72 BPM | HEIGHT: 72 IN

## 2018-07-31 DIAGNOSIS — I25.110 CORONARY ARTERY DISEASE INVOLVING NATIVE CORONARY ARTERY OF NATIVE HEART WITH UNSTABLE ANGINA PECTORIS (HCC): ICD-10-CM

## 2018-07-31 DIAGNOSIS — I51.9 MILD DIASTOLIC DYSFUNCTION: ICD-10-CM

## 2018-07-31 DIAGNOSIS — Z87.891 FORMER SMOKER: ICD-10-CM

## 2018-07-31 DIAGNOSIS — Z98.61 POST PTCA: Primary | ICD-10-CM

## 2018-07-31 DIAGNOSIS — R07.2 PRECORDIAL PAIN: ICD-10-CM

## 2018-07-31 DIAGNOSIS — E78.5 DYSLIPIDEMIA: ICD-10-CM

## 2018-07-31 DIAGNOSIS — I10 ESSENTIAL HYPERTENSION: ICD-10-CM

## 2018-07-31 DIAGNOSIS — I71.20 THORACIC AORTIC ANEURYSM WITHOUT RUPTURE: ICD-10-CM

## 2018-07-31 PROCEDURE — 1101F PT FALLS ASSESS-DOCD LE1/YR: CPT | Performed by: INTERNAL MEDICINE

## 2018-07-31 PROCEDURE — G8598 ASA/ANTIPLAT THER USED: HCPCS | Performed by: INTERNAL MEDICINE

## 2018-07-31 PROCEDURE — 4040F PNEUMOC VAC/ADMIN/RCVD: CPT | Performed by: INTERNAL MEDICINE

## 2018-07-31 PROCEDURE — 1036F TOBACCO NON-USER: CPT | Performed by: INTERNAL MEDICINE

## 2018-07-31 PROCEDURE — G8427 DOCREV CUR MEDS BY ELIG CLIN: HCPCS | Performed by: INTERNAL MEDICINE

## 2018-07-31 PROCEDURE — 93000 ELECTROCARDIOGRAM COMPLETE: CPT | Performed by: INTERNAL MEDICINE

## 2018-07-31 PROCEDURE — 1111F DSCHRG MED/CURRENT MED MERGE: CPT | Performed by: INTERNAL MEDICINE

## 2018-07-31 PROCEDURE — 99214 OFFICE O/P EST MOD 30 MIN: CPT | Performed by: INTERNAL MEDICINE

## 2018-07-31 PROCEDURE — 1123F ACP DISCUSS/DSCN MKR DOCD: CPT | Performed by: INTERNAL MEDICINE

## 2018-07-31 PROCEDURE — G8417 CALC BMI ABV UP PARAM F/U: HCPCS | Performed by: INTERNAL MEDICINE

## 2018-07-31 RX ORDER — RANOLAZINE 500 MG/1
500 TABLET, EXTENDED RELEASE ORAL 2 TIMES DAILY
Qty: 60 TABLET | Refills: 3 | Status: SHIPPED | OUTPATIENT
Start: 2018-07-31 | End: 2018-11-06 | Stop reason: SDUPTHER

## 2018-07-31 NOTE — PROGRESS NOTES
CARDIOLOGY   NOTE    Chief Complaint: Chest pain    HPI:   Carla Holder is a 81y.o. year old who has history as noted below. Very pleasant  Czech gentleman who looks much better than his stated age. HE had abnormal stress test which led to PCI to LAd on 7/16/18. He is Czech-speaking  was used for interview today  He tells me that he  developed chest pressure which starts out between the shoulder . He took 6 tyelenol which helped . HE says pain was very severe He has associated shortness of breath. He si staking a;l the meds that home care nurse has put in his box   He was mowing lawn and had to stop because of pain. He has long-standing history of shortness of breath but inhalers are not helping. he does see pulmonology      Current Outpatient Prescriptions   Medication Sig Dispense Refill    ranolazine (RANEXA) 500 MG extended release tablet Take 1 tablet by mouth 2 times daily 60 tablet 3    guaiFENesin (MUCINEX) 600 MG extended release tablet Take 1 tablet by mouth 2 times daily To thin mucus (please give instructions in Armenian) 60 tablet 1    azithromycin (ZITHROMAX) 250 MG tablet Take 2 tablets on Day 1, then 1 tablet per day on Day 2 through Day 5 6 tablet 0    aspirin 81 MG chewable tablet Take 1 tablet by mouth daily 30 tablet 3    simvastatin (ZOCOR) 40 MG tablet Take 1 tablet by mouth nightly 30 tablet 3    carvedilol (COREG) 6.25 MG tablet Take 1 tablet by mouth 2 times daily (with meals) 60 tablet 3    clopidogrel (PLAVIX) 75 MG tablet Take 1 tablet by mouth daily 30 tablet 3    tamsulosin (FLOMAX) 0.4 MG capsule Take 2 capsules by mouth nightly 60 capsule 5    furosemide (LASIX) 20 MG tablet Take 1 tablet by mouth 2 times daily 60 tablet 5    omeprazole (PRILOSEC) 40 MG delayed release capsule Take 1 capsule by mouth every morning 90 capsule 1    DULoxetine (CYMBALTA) 20 MG extended release capsule Take 1 capsule by mouth nightly For pain 33.23 kg/m². Vitals:    07/31/18 1434   BP: 122/74   Pulse: 72        General Appearance:  No distress, conversant  Constitutional:  Well developed, Well nourished, No acute distress, Non-toxic appearance. HENT:  Normocephalic, Atraumatic, Bilateral external ears normal, Oropharynx moist, No oral exudates, Nose normal. Neck- Normal range of motion, No tenderness, Supple, No stridor,no apical-carotid delay  Eyes:  PERRL, EOMI, Conjunctiva normal, No discharge. Respiratory:  Normal breath sounds, No respiratory distress, No wheezing, No chest tenderness. ,no use of accessory muscles, NO crackles  Cardiovascular: (PMI) apex non displaced,no lifts no thrills,S1 and S2 audible, No added heart sounds, No signs of ankle edema, or volume overload, No evidence of JVD, No crackles  GI:  Bowel sounds normal, Soft, No tenderness, No masses, No gross visceromegaly   :  No costovertebral angle tenderness   Musculoskeletal:  No edema, no tenderness, no deformities.  Back- no tenderness  Integument:  Well hydrated, no rash   Lymphatic:  No lymphadenopathy noted   Neurologic:  Alert & oriented x 3, CN 2-12 normal, normal motor function, normal sensory function, no focal deficits noted   Psychiatric:  Speech and behavior appropriate       Medical decision making and Data review:  DATA:  Lab Results   Component Value Date    TROPONINT <0.010 07/15/2018     BNP:    Lab Results   Component Value Date    PROBNP 136.2 07/15/2018     PT/INR:  No results found for: PTINR  No results found for: LABA1C  Lab Results   Component Value Date    CHOL 212 (H) 07/16/2018    TRIG 83 07/16/2018    HDL 64 07/16/2018    LDLCALC 131 (H) 03/02/2012    LDLDIRECT 152 (H) 07/16/2018     Lab Results   Component Value Date     (H) 07/16/2018     (H) 07/16/2018     TSH:   Lab Results   Component Value Date    TSH 5.27 (H) 04/03/2018     Stress test 5/11/18  Summary    Supervising physician Dr. Main Jernigan to a lexiscan patient

## 2018-08-06 ENCOUNTER — OFFICE VISIT (OUTPATIENT)
Dept: FAMILY MEDICINE CLINIC | Age: 83
End: 2018-08-06

## 2018-08-06 VITALS
OXYGEN SATURATION: 96 % | BODY MASS INDEX: 33.2 KG/M2 | WEIGHT: 244.8 LBS | HEART RATE: 56 BPM | DIASTOLIC BLOOD PRESSURE: 74 MMHG | SYSTOLIC BLOOD PRESSURE: 120 MMHG

## 2018-08-06 DIAGNOSIS — Z95.5 S/P PRIMARY ANGIOPLASTY WITH CORONARY STENT: ICD-10-CM

## 2018-08-06 DIAGNOSIS — G89.4 CHRONIC PAIN SYNDROME: ICD-10-CM

## 2018-08-06 DIAGNOSIS — M47.816 SPONDYLOSIS OF LUMBAR REGION WITHOUT MYELOPATHY OR RADICULOPATHY: ICD-10-CM

## 2018-08-06 DIAGNOSIS — J44.9 COPD, MILD (HCC): ICD-10-CM

## 2018-08-06 DIAGNOSIS — I25.110 CORONARY ARTERY DISEASE INVOLVING NATIVE CORONARY ARTERY OF NATIVE HEART WITH UNSTABLE ANGINA PECTORIS (HCC): Primary | ICD-10-CM

## 2018-08-06 PROCEDURE — G8417 CALC BMI ABV UP PARAM F/U: HCPCS | Performed by: FAMILY MEDICINE

## 2018-08-06 PROCEDURE — 1123F ACP DISCUSS/DSCN MKR DOCD: CPT | Performed by: FAMILY MEDICINE

## 2018-08-06 PROCEDURE — G8427 DOCREV CUR MEDS BY ELIG CLIN: HCPCS | Performed by: FAMILY MEDICINE

## 2018-08-06 PROCEDURE — 1111F DSCHRG MED/CURRENT MED MERGE: CPT | Performed by: FAMILY MEDICINE

## 2018-08-06 PROCEDURE — 1036F TOBACCO NON-USER: CPT | Performed by: FAMILY MEDICINE

## 2018-08-06 PROCEDURE — 1101F PT FALLS ASSESS-DOCD LE1/YR: CPT | Performed by: FAMILY MEDICINE

## 2018-08-06 PROCEDURE — 3023F SPIROM DOC REV: CPT | Performed by: FAMILY MEDICINE

## 2018-08-06 PROCEDURE — G8598 ASA/ANTIPLAT THER USED: HCPCS | Performed by: FAMILY MEDICINE

## 2018-08-06 PROCEDURE — G8926 SPIRO NO PERF OR DOC: HCPCS | Performed by: FAMILY MEDICINE

## 2018-08-06 PROCEDURE — 99213 OFFICE O/P EST LOW 20 MIN: CPT | Performed by: FAMILY MEDICINE

## 2018-08-06 PROCEDURE — 4040F PNEUMOC VAC/ADMIN/RCVD: CPT | Performed by: FAMILY MEDICINE

## 2018-08-06 RX ORDER — DULOXETIN HYDROCHLORIDE 20 MG/1
CAPSULE, DELAYED RELEASE ORAL
Qty: 60 CAPSULE | Refills: 3 | Status: SHIPPED | OUTPATIENT
Start: 2018-08-06 | End: 2019-01-10 | Stop reason: SDUPTHER

## 2018-08-06 NOTE — LETTER
August 6, 2018     Estella Haro  [place] [address]      Dear Susy Gomez and Iris Lopez,     Thank you for enrolling in [address] Please follow the instructions below to securely access your online medical record. [employer] allows you to send messages to your doctor, view your test results, renew your prescriptions, schedule appointments, and more. How Do I Sign Up? 1. In your Internet browser, go to [email or web address]  2. Click on the Sign Up Now link in the Sign In box. You will see the New Member Sign Up page. 3. Enter your [employer] Access Code exactly as it appears below. You will not need to use this code after youve completed the sign-up process. If you do not sign up before the expiration date, you must request a new code. [employer] Access Code: [email or web address]  Activation Code Expiration: 9/15/2018  8:55 AM  Enter your Social Security Number ([id number]) and Date of Birth (mm/dd/yyyy) as indicated and click Submit. You will be taken to the next sign-up page. 4. Create a [employer] ID. This will be your [employer] login ID and cannot be changed, so think of one that is secure and easy to remember. 5. Create a [employer] password. You can change your password at any time. 6. Enter your Password Reset Question and Answer. This can be used at a later time if you forget your password. 7. Enter your e-mail address. You will receive e-mail notification when new information is available in [address] 8. Click Sign Up. You can now view your medical record. Additional Information  If you have questions, please contact the physician practice where you receive care. Remember, [employer] is NOT to be used for urgent needs. For medical emergencies, dial 911. For questions regarding your [employer] account call [phone]. If you have a clinical question, please call your doctor's office.

## 2018-08-06 NOTE — PROGRESS NOTES
Plan: Still has ongoing fatigue and physical deconditioning. F/u with cardiology tomorrow for nuclear study due to ongoing fatigue s/p stenting LAD. Continue COPD inhalers as taking. Trial increase in cymbalta to 2 capsules nightly once cleared by cardiology    I called son Júnior Batista and left message regarding today's discussion. Staff will mail him Cezarhart sign up to help with keeping up communication as patient has allowed his son to be involved with all his care. Diagnosis Assessment and Associated Orders:     Diagnosis Orders   1. Coronary artery disease involving native coronary artery of native heart with unstable angina pectoris ([facility]     2. S/P primary angioplasty with coronary stent     3. COPD, mild ([facility]     4. Spondylosis of lumbar region without myelopathy or radiculopathy  DULoxetine (CYMBALTA) 20 MG extended release capsule   5. Chronic pain syndrome  DULoxetine (CYMBALTA) 20 MG extended release capsule        All patient questions answered. Pt voiced understanding. Return for 3 months extended OV f/u cymbalta CAD .  -----------------------------------------------------------------------------------------------            Chief Complaint   Patient presents with    Discuss Medications     History done with  present. He is s/p PCI to LAd on 7/16/18. Still has some fatigue but no further chest pressure or chest pain. Using his COPD inhaler occasionally, no increase sputum, no fevers or chills. Chrnic back pain stable on cymbalta. Still has difficulty sleeping. On plavix and aspirin The patient denies abnormal bruising or abnormal bleeding from any body orifice such as bleeding from nose or gums, blood in urine or stool, or melena, hemoptysis or hematemesis.   .   No results found for: LABA1C  Lab Results   Component Value Date    LABMICR Not Indicated 11/21/2014    CREATININE 0.9 07/16/2018     Lab Results   Component Value Date     (H) 07/16/2018    AST

## 2018-08-07 ENCOUNTER — PROCEDURE VISIT (OUTPATIENT)
Dept: CARDIOLOGY CLINIC | Age: 83
End: 2018-08-07

## 2018-08-07 DIAGNOSIS — I51.9 MILD DIASTOLIC DYSFUNCTION: ICD-10-CM

## 2018-08-07 DIAGNOSIS — I10 ESSENTIAL HYPERTENSION: ICD-10-CM

## 2018-08-07 DIAGNOSIS — R07.2 PRECORDIAL PAIN: ICD-10-CM

## 2018-08-07 DIAGNOSIS — E78.5 DYSLIPIDEMIA: ICD-10-CM

## 2018-08-07 DIAGNOSIS — I71.20 THORACIC AORTIC ANEURYSM WITHOUT RUPTURE: ICD-10-CM

## 2018-08-07 DIAGNOSIS — I25.110 CORONARY ARTERY DISEASE INVOLVING NATIVE CORONARY ARTERY OF NATIVE HEART WITH UNSTABLE ANGINA PECTORIS (HCC): ICD-10-CM

## 2018-08-07 DIAGNOSIS — Z87.891 FORMER SMOKER: ICD-10-CM

## 2018-08-07 DIAGNOSIS — Z98.61 POST PTCA: ICD-10-CM

## 2018-08-07 LAB
LV EF: 54 %
LVEF MODALITY: NORMAL

## 2018-08-07 PROCEDURE — A9500 TC99M SESTAMIBI: HCPCS | Performed by: INTERNAL MEDICINE

## 2018-08-07 PROCEDURE — 78452 HT MUSCLE IMAGE SPECT MULT: CPT | Performed by: INTERNAL MEDICINE

## 2018-08-07 PROCEDURE — 93015 CV STRESS TEST SUPVJ I&R: CPT | Performed by: INTERNAL MEDICINE

## 2018-08-08 ENCOUNTER — TELEPHONE (OUTPATIENT)
Dept: CARDIOLOGY CLINIC | Age: 83
End: 2018-08-08

## 2018-08-08 NOTE — TELEPHONE ENCOUNTER
ECG portion of stress test is negative for ischemia by diagnostic criteria.    Decreased uptake inferiorly due to diaphragmatic artifact.    Normal EF 54 % with normal ventricular contractility.    Normal stress myocardial perfusion. No infarct or ischemia noted.    This is a normal study. Left message for stress test results.

## 2018-09-04 ENCOUNTER — OFFICE VISIT (OUTPATIENT)
Dept: CARDIOLOGY CLINIC | Age: 83
End: 2018-09-04

## 2018-09-04 ENCOUNTER — TELEPHONE (OUTPATIENT)
Dept: CARDIOLOGY CLINIC | Age: 83
End: 2018-09-04

## 2018-09-04 VITALS
SYSTOLIC BLOOD PRESSURE: 110 MMHG | HEIGHT: 72 IN | HEART RATE: 80 BPM | BODY MASS INDEX: 32.91 KG/M2 | DIASTOLIC BLOOD PRESSURE: 62 MMHG | WEIGHT: 243 LBS

## 2018-09-04 DIAGNOSIS — I25.110 CORONARY ARTERY DISEASE INVOLVING NATIVE CORONARY ARTERY OF NATIVE HEART WITH UNSTABLE ANGINA PECTORIS (HCC): Primary | ICD-10-CM

## 2018-09-04 DIAGNOSIS — I71.20 THORACIC AORTIC ANEURYSM WITHOUT RUPTURE: ICD-10-CM

## 2018-09-04 DIAGNOSIS — Z87.891 FORMER SMOKER: ICD-10-CM

## 2018-09-04 DIAGNOSIS — R07.2 PRECORDIAL PAIN: ICD-10-CM

## 2018-09-04 DIAGNOSIS — I10 ESSENTIAL HYPERTENSION: ICD-10-CM

## 2018-09-04 DIAGNOSIS — E78.5 DYSLIPIDEMIA: ICD-10-CM

## 2018-09-04 PROCEDURE — 1123F ACP DISCUSS/DSCN MKR DOCD: CPT | Performed by: INTERNAL MEDICINE

## 2018-09-04 PROCEDURE — G8417 CALC BMI ABV UP PARAM F/U: HCPCS | Performed by: INTERNAL MEDICINE

## 2018-09-04 PROCEDURE — 99214 OFFICE O/P EST MOD 30 MIN: CPT | Performed by: INTERNAL MEDICINE

## 2018-09-04 PROCEDURE — 1036F TOBACCO NON-USER: CPT | Performed by: INTERNAL MEDICINE

## 2018-09-04 PROCEDURE — G8598 ASA/ANTIPLAT THER USED: HCPCS | Performed by: INTERNAL MEDICINE

## 2018-09-04 PROCEDURE — 1101F PT FALLS ASSESS-DOCD LE1/YR: CPT | Performed by: INTERNAL MEDICINE

## 2018-09-04 PROCEDURE — 4040F PNEUMOC VAC/ADMIN/RCVD: CPT | Performed by: INTERNAL MEDICINE

## 2018-09-04 PROCEDURE — G8427 DOCREV CUR MEDS BY ELIG CLIN: HCPCS | Performed by: INTERNAL MEDICINE

## 2018-09-04 NOTE — PROGRESS NOTES
No stridor,no apical-carotid delay  Eyes:  PERRL, EOMI, Conjunctiva normal, No discharge. Respiratory:  Normal breath sounds, No respiratory distress, No wheezing, No chest tenderness. ,no use of accessory muscles, NO crackles  Cardiovascular: (PMI) apex non displaced,no lifts no thrills,S1 and S2 audible, No added heart sounds, No signs of ankle edema, or volume overload, No evidence of JVD, No crackles  GI:  Bowel sounds normal, Soft, No tenderness, No masses, No gross visceromegaly   :  No costovertebral angle tenderness   Musculoskeletal:  No edema, no tenderness, no deformities.  Back- no tenderness  Integument:  Well hydrated, no rash   Lymphatic:  No lymphadenopathy noted   Neurologic:  Alert & oriented x 3, CN 2-12 normal, normal motor function, normal sensory function, no focal deficits noted   Psychiatric:  Speech and behavior appropriate       Medical decision making and Data review:  DATA:  Lab Results   Component Value Date    TROPONINT <0.010 07/15/2018     BNP:    Lab Results   Component Value Date    PROBNP 136.2 07/15/2018     PT/INR:  No results found for: PTINR  No results found for: LABA1C  Lab Results   Component Value Date    CHOL 212 (H) 07/16/2018    TRIG 83 07/16/2018    HDL 64 07/16/2018    LDLCALC 131 (H) 03/02/2012    LDLDIRECT 152 (H) 07/16/2018     Lab Results   Component Value Date     (H) 07/16/2018     (H) 07/16/2018     TSH:   Lab Results   Component Value Date    TSH 5.27 (H) 04/03/2018     Stress test 5/11/18  Summary    Supervising physician Dr. Maria L Dodson to a Navi Nathaniel patient unable to walk on the treadmill.   [place] portion of stress test is negative for ischemia by diagnostic criteria.    Normal EF 52 % with normal ventricular contractility.    Abnormal stress images    mild small size lateral wall perfusion abnormality in stress images    Medium , moderate inferior wall ischemia         Echo 5/11/18   Summary   Left ventricular systolic function is normal with an ejection fraction of   50-60%. Mild concentric left ventricular hypertrophy. Grade I diastolic   dysfunction.   Sclerotic, but non-stenotic aortic valve.   No evidence of pericardial effusion.   Dilated aortic root at 4.1 cm.     Cath 7/16/18  Procedure Summary   Patient has very tortuous vasculature both groin vessels &   coronaries.   1. Significant single vessel CAD of LAD.   2. Normal LV systolic function. LVEF > 65 %.   3. very difficult but successful PCI of LAD with excellent   results. If needed again, patient will need an AL3 guide.      Angiographic Findings    Diagnostic Findings    Cardiac Arteries and Lesion Findings   LMCA: Normal.Large  LAD: Single stenosis. 80 % mid vessel stenosis.   - 2.0 x 15 mm Mini Trek RX Balloon. Diameter: 2 mm. Length: 15 mm. 1    inflation(s) to a max pressure of: 16 oumou.     - 3.00 x 23 RX 10 [facility]. 2 inflation(s) to a max pressure of:    20 oumou.  LCx: Mild Lumen Irregularity. RCA: Normal.Large  Very tortuous  Sue Beecham abnormality of origin    Lexiscan 8/7/18  Summary    Supervising physician Dr. Rivers Button portion of stress test is negative for ischemia by diagnostic criteria.    Decreased uptake inferiorly due to diaphragmatic artifact.    Normal EF 54 % with normal ventricular contractility.    Normal stress myocardial perfusion. No infarct or ischemia noted.    This is a normal study.               All labs, medications and tests reviewed by myself including data and history from outside source , patient and available family . Assessment & Plan:      1. Coronary artery disease involving native coronary artery of native heart with unstable angina pectoris ([address]    2. Precordial pain    3. Dyslipidemia    4. Essential hypertension    5. Thoracic aortic aneurysm without rupture (HCC) 4.1 cm- need repeat U/S Nov 2018    6.  Former smoker         ASCVD/   Chest pain and pain after eating could be gastritis , He says he has a lot of pain

## 2018-09-07 ENCOUNTER — TELEPHONE (OUTPATIENT)
Dept: CARDIOLOGY CLINIC | Age: 83
End: 2018-09-07

## 2018-09-10 ENCOUNTER — PROCEDURE VISIT (OUTPATIENT)
Dept: CARDIOLOGY CLINIC | Age: 83
End: 2018-09-10

## 2018-09-10 DIAGNOSIS — I71.20 THORACIC AORTIC ANEURYSM WITHOUT RUPTURE: Primary | ICD-10-CM

## 2018-09-10 DIAGNOSIS — R07.2 PRECORDIAL PAIN: ICD-10-CM

## 2018-09-10 DIAGNOSIS — E78.5 DYSLIPIDEMIA: ICD-10-CM

## 2018-09-10 DIAGNOSIS — I25.110 CORONARY ARTERY DISEASE INVOLVING NATIVE CORONARY ARTERY OF NATIVE HEART WITH UNSTABLE ANGINA PECTORIS (HCC): ICD-10-CM

## 2018-09-10 DIAGNOSIS — Z87.891 FORMER SMOKER: ICD-10-CM

## 2018-09-10 DIAGNOSIS — I10 ESSENTIAL HYPERTENSION: ICD-10-CM

## 2018-09-10 PROCEDURE — 93308 TTE F-UP OR LMTD: CPT | Performed by: INTERNAL MEDICINE

## 2018-09-13 ENCOUNTER — TELEPHONE (OUTPATIENT)
Dept: CARDIOLOGY CLINIC | Age: 83
End: 2018-09-13

## 2018-09-13 NOTE — TELEPHONE ENCOUNTER
Do you want him to CPM and see you at is next visit which is 12/4/18?           Conclusions      Summary   Limited Echo   LV function is normal , EF 50-55%.   Aortic valve appear sclerotic but not stenotic.   Mitral regurgitation visualized.   Tricuspid regurgitation visualized.   Pulmonic regurgitation present.   Aortic root dilation noted in previous ultrasound 4.1cm remains stable .   No evidence of pericardial effusion.      Signature      ------------------------------------------------------------------   Electronically signed by Emelia Bowers MD (Interpreting   [email or web address]) on 09/10/2018 at 10:55 AM

## 2018-10-04 ENCOUNTER — OFFICE VISIT (OUTPATIENT)
Dept: PULMONOLOGY | Age: 83
End: 2018-10-04
Payer: MEDICARE

## 2018-10-04 VITALS
DIASTOLIC BLOOD PRESSURE: 60 MMHG | BODY MASS INDEX: 32.94 KG/M2 | WEIGHT: 243.2 LBS | SYSTOLIC BLOOD PRESSURE: 124 MMHG | OXYGEN SATURATION: 98 % | HEART RATE: 77 BPM | HEIGHT: 72 IN

## 2018-10-04 DIAGNOSIS — R06.09 DYSPNEA ON EXERTION: ICD-10-CM

## 2018-10-04 DIAGNOSIS — Z87.891 FORMER SMOKER: ICD-10-CM

## 2018-10-04 DIAGNOSIS — J44.9 COPD, MILD (HCC): Primary | ICD-10-CM

## 2018-10-04 PROCEDURE — 99213 OFFICE O/P EST LOW 20 MIN: CPT | Performed by: INTERNAL MEDICINE

## 2018-10-04 RX ORDER — AMLODIPINE BESYLATE 5 MG/1
5 TABLET ORAL DAILY
COMMUNITY
End: 2018-11-06 | Stop reason: SDUPTHER

## 2018-10-04 NOTE — PROGRESS NOTES
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 He also mentions that he ran out of his Anoro almost 4 months ago and is only been using a rescue albuterol inhaler. He denies chest pain or chest discomfort. He does have daily sputum expectoration and sometimes it's rather thick. This conversation took place with an     OBJECTIVE:  /60   Pulse 77   Ht 6' (1.829 m)   Wt 243 lb 3.2 oz (110.3 kg)   SpO2 98%   BMI 32.98 kg/m²      Physical Exam:  Constitutional:  He appears well developed and well-nourished. Neck:  Supple, No palpable lymphadenopathy, No JVD  Cardiovascular:  S1, S2 Normal, Regular rhythm, no murmurs or gallops, No pericardial  rubs. Pulmonary:  Breath sounds are coarse and I do hear some extra wheezing bilaterally. No rhonchi or rales are heard  Abdomen: Not examined  Extremities: no edema, No DVT  Neurologic:  Awake and Alert, No focal deficits    Radiology: Chest x-ray on 7/15/18 showed no acute process with some chronic bibasilar scarring versus atelectasis  CTA chest 5/24/18  Impression   Negative for acute pulmonary embolism.       Marked hemidiaphragmatic eventration results in bibasilar atelectasis and   diminished lung volumes.       Mosaic attenuation is suspicious for air trapping (i.e. obstructive small   airways disease).     Hyperdense renal lesion, increased in size 04/15/2011, may represent a   proteinaceous or hemorrhagic cyst although is indeterminate and could be   further evaluated with ultrasound - based on patient's comorbidities and life   expectancy. PFT: Office spirometry on 2/28/18 demonstrated a mild obstructive defect with no significant response to bronchodilators.   Because his FVC and FEV1 were both reduced I cannot rule out a restrictive lung process without further testing

## 2018-10-05 ENCOUNTER — TELEPHONE (OUTPATIENT)
Dept: CARDIOLOGY CLINIC | Age: 83
End: 2018-10-05

## 2018-10-05 NOTE — TELEPHONE ENCOUNTER
Patient's son Rasta Allen whom is on the HIPPA consent, called and wanted to speak with Dr. Norma Rojas. I explained to him that he is out of office until next week, and asked if I could have a nurse or an MA call him back. He stated that he prefers to talk with Dr Norma Rojas, as he feels he hasnt gotten anywhere otherwise, he was quite upset that his father had not been scheduled for cardiac rehab yet and states he has called several times and it still isn't done. I explained to him that we are not the ones who schedule the rehab, we only send the referral. He voiced his understanding, and  Phone number to Cardiac Rehab was given. He then stated that he is in [place], and would still like to speak with Dr. Norma Rojas to get his opinion on his health and what the prognosis of his father is, in his opinion. He would like to have Dr. Norma Rojas call him at [phone] at his earliest convenience.

## 2018-10-06 DIAGNOSIS — R07.2 PRECORDIAL PAIN: ICD-10-CM

## 2018-10-08 ENCOUNTER — TELEPHONE (OUTPATIENT)
Dept: CARDIOLOGY CLINIC | Age: 83
End: 2018-10-08

## 2018-10-11 RX ORDER — OMEPRAZOLE 40 MG/1
CAPSULE, DELAYED RELEASE ORAL
Qty: 90 CAPSULE | Refills: 0 | Status: SHIPPED | OUTPATIENT
Start: 2018-10-11 | End: 2018-11-06 | Stop reason: SDUPTHER

## 2018-10-11 NOTE — TELEPHONE ENCOUNTER
I have called him three times and left messages for [facility] . The number I have called is [phone]   I have left messages twice on the answering service .    IF there is a cell number I can call that or he can call me directly during office hours in [facility] or [place]

## 2018-11-06 ENCOUNTER — OFFICE VISIT (OUTPATIENT)
Dept: FAMILY MEDICINE CLINIC | Age: 83
End: 2018-11-06
Payer: MEDICARE

## 2018-11-06 VITALS
BODY MASS INDEX: 33.8 KG/M2 | DIASTOLIC BLOOD PRESSURE: 76 MMHG | OXYGEN SATURATION: 97 % | WEIGHT: 249.2 LBS | SYSTOLIC BLOOD PRESSURE: 144 MMHG | HEART RATE: 79 BPM

## 2018-11-06 DIAGNOSIS — I10 ESSENTIAL HYPERTENSION: ICD-10-CM

## 2018-11-06 DIAGNOSIS — N40.1 BENIGN PROSTATIC HYPERPLASIA WITH LOWER URINARY TRACT SYMPTOMS, SYMPTOM DETAILS UNSPECIFIED: ICD-10-CM

## 2018-11-06 DIAGNOSIS — R07.2 PRECORDIAL PAIN: ICD-10-CM

## 2018-11-06 DIAGNOSIS — I25.110 CORONARY ARTERY DISEASE INVOLVING NATIVE CORONARY ARTERY OF NATIVE HEART WITH UNSTABLE ANGINA PECTORIS (HCC): Primary | ICD-10-CM

## 2018-11-06 DIAGNOSIS — Z23 NEED FOR INFLUENZA VACCINATION: ICD-10-CM

## 2018-11-06 DIAGNOSIS — Z95.5 HX OF HEART ARTERY STENT: ICD-10-CM

## 2018-11-06 PROCEDURE — 1101F PT FALLS ASSESS-DOCD LE1/YR: CPT | Performed by: FAMILY MEDICINE

## 2018-11-06 PROCEDURE — G8598 ASA/ANTIPLAT THER USED: HCPCS | Performed by: FAMILY MEDICINE

## 2018-11-06 PROCEDURE — 99214 OFFICE O/P EST MOD 30 MIN: CPT | Performed by: FAMILY MEDICINE

## 2018-11-06 PROCEDURE — 90662 IIV NO PRSV INCREASED AG IM: CPT | Performed by: FAMILY MEDICINE

## 2018-11-06 PROCEDURE — 4040F PNEUMOC VAC/ADMIN/RCVD: CPT | Performed by: FAMILY MEDICINE

## 2018-11-06 PROCEDURE — 1123F ACP DISCUSS/DSCN MKR DOCD: CPT | Performed by: FAMILY MEDICINE

## 2018-11-06 PROCEDURE — 1036F TOBACCO NON-USER: CPT | Performed by: FAMILY MEDICINE

## 2018-11-06 PROCEDURE — G8427 DOCREV CUR MEDS BY ELIG CLIN: HCPCS | Performed by: FAMILY MEDICINE

## 2018-11-06 PROCEDURE — G0008 ADMIN INFLUENZA VIRUS VAC: HCPCS | Performed by: FAMILY MEDICINE

## 2018-11-06 PROCEDURE — G8482 FLU IMMUNIZE ORDER/ADMIN: HCPCS | Performed by: FAMILY MEDICINE

## 2018-11-06 PROCEDURE — G8417 CALC BMI ABV UP PARAM F/U: HCPCS | Performed by: FAMILY MEDICINE

## 2018-11-06 RX ORDER — TAMSULOSIN HYDROCHLORIDE 0.4 MG/1
0.8 CAPSULE ORAL NIGHTLY
Qty: 60 CAPSULE | Refills: 11 | Status: SHIPPED | OUTPATIENT
Start: 2018-11-06 | End: 2019-06-03 | Stop reason: SDUPTHER

## 2018-11-06 RX ORDER — AMLODIPINE BESYLATE 5 MG/1
5 TABLET ORAL DAILY
Qty: 30 TABLET | Refills: 11 | Status: SHIPPED | OUTPATIENT
Start: 2018-11-06 | End: 2019-06-03 | Stop reason: SDUPTHER

## 2018-11-06 RX ORDER — SIMVASTATIN 40 MG
40 TABLET ORAL NIGHTLY
Qty: 30 TABLET | Refills: 11 | Status: SHIPPED | OUTPATIENT
Start: 2018-11-06 | End: 2019-04-15 | Stop reason: SDUPTHER

## 2018-11-06 RX ORDER — FUROSEMIDE 20 MG/1
20 TABLET ORAL 2 TIMES DAILY
Qty: 60 TABLET | Refills: 11 | Status: SHIPPED | OUTPATIENT
Start: 2018-11-06 | End: 2019-06-03 | Stop reason: SDUPTHER

## 2018-11-06 RX ORDER — RANOLAZINE 500 MG/1
500 TABLET, EXTENDED RELEASE ORAL 2 TIMES DAILY
Qty: 60 TABLET | Refills: 11 | Status: SHIPPED | OUTPATIENT
Start: 2018-11-06 | End: 2019-01-11 | Stop reason: ALTCHOICE

## 2018-11-06 RX ORDER — CARVEDILOL 6.25 MG/1
6.25 TABLET ORAL 2 TIMES DAILY WITH MEALS
Qty: 60 TABLET | Refills: 11 | Status: SHIPPED | OUTPATIENT
Start: 2018-11-06 | End: 2019-04-12

## 2018-11-06 RX ORDER — OMEPRAZOLE 40 MG/1
CAPSULE, DELAYED RELEASE ORAL
Qty: 90 CAPSULE | Refills: 11 | Status: SHIPPED | OUTPATIENT
Start: 2018-11-06 | End: 2019-06-03 | Stop reason: SDUPTHER

## 2018-11-06 RX ORDER — CLOPIDOGREL BISULFATE 75 MG/1
75 TABLET ORAL DAILY
Qty: 30 TABLET | Refills: 11 | Status: SHIPPED | OUTPATIENT
Start: 2018-11-06 | End: 2019-06-03 | Stop reason: SDUPTHER

## 2018-11-06 ASSESSMENT — PATIENT HEALTH QUESTIONNAIRE - PHQ9
1. LITTLE INTEREST OR PLEASURE IN DOING THINGS: 0
SUM OF ALL RESPONSES TO PHQ9 QUESTIONS 1 & 2: 1
SUM OF ALL RESPONSES TO PHQ QUESTIONS 1-9: 1
SUM OF ALL RESPONSES TO PHQ QUESTIONS 1-9: 1
2. FEELING DOWN, DEPRESSED OR HOPELESS: 1

## 2018-11-06 NOTE — PROGRESS NOTES
Vaccine Information Sheet, \"Influenza - Inactivated\"  given to Adele Henao, or parent/legal guardian of  Adele Henao and verbalized understanding. Patient responses:    Have you ever had a reaction to a flu vaccine? No  Are you able to eat eggs without adverse effects? Yes  Do you have any current illness? No  Have you ever had Guillian [place] Syndrome? No    Flu vaccine given per order. Please see immunization tab.
sometimes has decreased appetite. No results found for: LABA1C  Lab Results   Component Value Date    LABMICR Not Indicated 11/21/2014    CREATININE 0.9 07/16/2018     Lab Results   Component Value Date     (H) 07/16/2018     (H) 07/16/2018     Lab Results   Component Value Date    CHOL 212 (H) 07/16/2018    TRIG 83 07/16/2018    HDL 64 07/16/2018    LDLCALC 131 (H) 03/02/2012    LDLDIRECT 152 (H) 07/16/2018            PHQ Scores 11/6/2018 12/15/2017   PHQ2 Score 1 1   PHQ9 Score 1 1     Interpretation of Total Score Depression Severity: 1-4 = Minimal depression, 5-9 = Mild depression, 10-14 = Moderate depression, 15-19 = Moderately severe depression, 20-27 = Severe depression      ROS: Pertinent items are noted in HPI. All other ROS negative     reports that he quit smoking about 9 years ago. He has a 162.50 pack-year smoking history. He has never used smokeless tobacco.   reports that he does not drink alcohol. Physical Exam   Nursing note reviewed  BP (!) 144/76 (Site: Left Upper Arm, Position: Sitting, Cuff Size: Large Adult)   Pulse 79   Wt 249 lb 3.2 oz (113 kg)   SpO2 97%   BMI 33.80 kg/m²   BP Readings from Last 3 Encounters:   11/06/18 (!) 144/76   10/04/18 124/60   09/04/18 110/62     Wt Readings from Last 3 Encounters:   11/06/18 249 lb 3.2 oz (113 kg)   10/04/18 243 lb 3.2 oz (110.3 kg)   09/04/18 243 lb (110.2 kg)     Constitutional: He is oriented to person, place, and time here with , Lydia Mckinley from [facility]  Neck: Trachea normal. Neck supple. Cardiovascular: Normal rate, regular rhythm, S1 normal, S2 normal and normal heart sounds. No murmur heard. Pulmonary/Chest: Hyperresonant breath sounds. No accessory muscle usage. No respiratory distress. . He  has no wheezes or ronchi. Abdominal: Soft. Normal appearance and bowel sounds are normal.   Neurological: He  is alert and oriented to person, place, and time. no CN deficits.    Skin: He  is not

## 2018-11-09 ENCOUNTER — HOSPITAL ENCOUNTER (OUTPATIENT)
Dept: CARDIAC REHAB | Age: 83
Setting detail: THERAPIES SERIES
Discharge: HOME | End: 2018-11-09
Payer: MEDICARE

## 2018-11-16 ENCOUNTER — TELEPHONE (OUTPATIENT)
Dept: FAMILY MEDICINE CLINIC | Age: 83
End: 2018-11-16

## 2018-11-16 NOTE — TELEPHONE ENCOUNTER
Patient came into the office and gave me 4 medication bottles Plavix, Zocor, Omeprazole, and one other. I tried to understand what he wanted I asked if he needed refills and he declined. Due to the language barrier and no  present I could not understand what he wanted. I believe he may have wanted to ask if he still needed to take the medications that were brought in. I told him Dr. Chelle Moss was not currently in office but that I could send her a message and advised him to not stop meds especially Plavix. Patient left angered due to me being unable to correctly understand what he needed. Message sent to Dr. Chelle Moss because I am concerned about him stopping plavix.

## 2018-11-19 ENCOUNTER — HOSPITAL ENCOUNTER (OUTPATIENT)
Dept: CARDIAC REHAB | Age: 83
Setting detail: THERAPIES SERIES
Discharge: HOME | End: 2018-11-19
Payer: MEDICARE

## 2018-11-19 PROCEDURE — 93798 PHYS/QHP OP CAR RHAB W/ECG: CPT

## 2018-11-20 ENCOUNTER — HOSPITAL ENCOUNTER (OUTPATIENT)
Dept: CARDIAC REHAB | Age: 83
Setting detail: THERAPIES SERIES
Discharge: HOME | End: 2018-11-20
Payer: MEDICARE

## 2018-11-20 PROCEDURE — 93798 PHYS/QHP OP CAR RHAB W/ECG: CPT

## 2018-11-22 ENCOUNTER — APPOINTMENT (OUTPATIENT)
Dept: CARDIAC REHAB | Age: 83
End: 2018-11-22
Payer: MEDICARE

## 2018-11-26 ENCOUNTER — HOSPITAL ENCOUNTER (OUTPATIENT)
Dept: CARDIAC REHAB | Age: 83
Setting detail: THERAPIES SERIES
Discharge: HOME | End: 2018-11-26
Payer: MEDICARE

## 2018-11-26 PROCEDURE — 93798 PHYS/QHP OP CAR RHAB W/ECG: CPT

## 2018-11-27 ENCOUNTER — HOSPITAL ENCOUNTER (OUTPATIENT)
Dept: CARDIAC REHAB | Age: 83
Setting detail: THERAPIES SERIES
Discharge: HOME | End: 2018-11-27
Payer: MEDICARE

## 2018-11-27 PROCEDURE — 93798 PHYS/QHP OP CAR RHAB W/ECG: CPT

## 2018-11-29 ENCOUNTER — HOSPITAL ENCOUNTER (OUTPATIENT)
Dept: CARDIAC REHAB | Age: 83
Setting detail: THERAPIES SERIES
Discharge: HOME | End: 2018-11-29
Payer: MEDICARE

## 2018-11-29 PROCEDURE — 93798 PHYS/QHP OP CAR RHAB W/ECG: CPT

## 2018-12-03 ENCOUNTER — HOSPITAL ENCOUNTER (OUTPATIENT)
Dept: CARDIAC REHAB | Age: 83
Setting detail: THERAPIES SERIES
Discharge: HOME | End: 2018-12-03
Payer: MEDICARE

## 2018-12-03 PROCEDURE — 93798 PHYS/QHP OP CAR RHAB W/ECG: CPT

## 2018-12-04 ENCOUNTER — HOSPITAL ENCOUNTER (OUTPATIENT)
Dept: CARDIAC REHAB | Age: 83
Setting detail: THERAPIES SERIES
Discharge: HOME | End: 2018-12-04
Payer: MEDICARE

## 2018-12-04 PROCEDURE — 93798 PHYS/QHP OP CAR RHAB W/ECG: CPT

## 2018-12-06 ENCOUNTER — HOSPITAL ENCOUNTER (OUTPATIENT)
Dept: CARDIAC REHAB | Age: 83
Setting detail: THERAPIES SERIES
Discharge: HOME | End: 2018-12-06
Payer: MEDICARE

## 2018-12-06 PROCEDURE — 93798 PHYS/QHP OP CAR RHAB W/ECG: CPT

## 2018-12-10 ENCOUNTER — HOSPITAL ENCOUNTER (OUTPATIENT)
Dept: CARDIAC REHAB | Age: 83
Setting detail: THERAPIES SERIES
Discharge: HOME | End: 2018-12-10
Payer: MEDICARE

## 2018-12-10 PROCEDURE — 93798 PHYS/QHP OP CAR RHAB W/ECG: CPT

## 2018-12-11 ENCOUNTER — HOSPITAL ENCOUNTER (OUTPATIENT)
Dept: CARDIAC REHAB | Age: 83
Setting detail: THERAPIES SERIES
Discharge: HOME | End: 2018-12-11
Payer: MEDICARE

## 2018-12-11 PROCEDURE — 93798 PHYS/QHP OP CAR RHAB W/ECG: CPT

## 2018-12-13 ENCOUNTER — HOSPITAL ENCOUNTER (OUTPATIENT)
Dept: CARDIAC REHAB | Age: 83
Setting detail: THERAPIES SERIES
Discharge: HOME | End: 2018-12-13
Payer: MEDICARE

## 2018-12-13 PROCEDURE — 93798 PHYS/QHP OP CAR RHAB W/ECG: CPT

## 2018-12-17 ENCOUNTER — HOSPITAL ENCOUNTER (OUTPATIENT)
Dept: CARDIAC REHAB | Age: 83
Setting detail: THERAPIES SERIES
Discharge: HOME | End: 2018-12-17
Payer: MEDICARE

## 2018-12-17 PROCEDURE — 93798 PHYS/QHP OP CAR RHAB W/ECG: CPT

## 2018-12-18 ENCOUNTER — HOSPITAL ENCOUNTER (OUTPATIENT)
Dept: CARDIAC REHAB | Age: 83
Setting detail: THERAPIES SERIES
Discharge: HOME | End: 2018-12-18
Payer: MEDICARE

## 2018-12-18 PROCEDURE — 93798 PHYS/QHP OP CAR RHAB W/ECG: CPT

## 2018-12-20 ENCOUNTER — HOSPITAL ENCOUNTER (OUTPATIENT)
Dept: CARDIAC REHAB | Age: 83
Setting detail: THERAPIES SERIES
Discharge: HOME | End: 2018-12-20
Payer: MEDICARE

## 2018-12-20 PROCEDURE — 93798 PHYS/QHP OP CAR RHAB W/ECG: CPT

## 2018-12-24 ENCOUNTER — HOSPITAL ENCOUNTER (OUTPATIENT)
Dept: CARDIAC REHAB | Age: 83
Setting detail: THERAPIES SERIES
Discharge: HOME | End: 2018-12-24
Payer: MEDICARE

## 2018-12-24 PROCEDURE — 93798 PHYS/QHP OP CAR RHAB W/ECG: CPT

## 2018-12-25 ENCOUNTER — APPOINTMENT (OUTPATIENT)
Dept: CARDIAC REHAB | Age: 83
End: 2018-12-25
Payer: MEDICARE

## 2018-12-27 ENCOUNTER — HOSPITAL ENCOUNTER (OUTPATIENT)
Dept: CARDIAC REHAB | Age: 83
Setting detail: THERAPIES SERIES
Discharge: HOME | End: 2018-12-27
Payer: MEDICARE

## 2018-12-27 PROCEDURE — 93798 PHYS/QHP OP CAR RHAB W/ECG: CPT

## 2018-12-31 ENCOUNTER — HOSPITAL ENCOUNTER (OUTPATIENT)
Dept: CARDIAC REHAB | Age: 83
Setting detail: THERAPIES SERIES
Discharge: HOME | End: 2018-12-31
Payer: MEDICARE

## 2019-01-01 ENCOUNTER — APPOINTMENT (OUTPATIENT)
Dept: CARDIAC REHAB | Age: 84
End: 2019-01-01
Payer: MEDICARE

## 2019-01-03 ENCOUNTER — HOSPITAL ENCOUNTER (OUTPATIENT)
Dept: CARDIAC REHAB | Age: 84
Setting detail: THERAPIES SERIES
Discharge: HOME | End: 2019-01-03
Payer: MEDICARE

## 2019-01-03 PROCEDURE — 93798 PHYS/QHP OP CAR RHAB W/ECG: CPT

## 2019-01-10 ENCOUNTER — HOSPITAL ENCOUNTER (OUTPATIENT)
Dept: CARDIAC REHAB | Age: 84
Setting detail: THERAPIES SERIES
Discharge: HOME | End: 2019-01-10
Payer: MEDICARE

## 2019-01-10 PROCEDURE — 93798 PHYS/QHP OP CAR RHAB W/ECG: CPT

## 2019-01-11 ENCOUNTER — OFFICE VISIT (OUTPATIENT)
Dept: CARDIOLOGY CLINIC | Age: 84
End: 2019-01-11
Payer: MEDICARE

## 2019-01-11 VITALS
SYSTOLIC BLOOD PRESSURE: 134 MMHG | HEIGHT: 72 IN | BODY MASS INDEX: 32.51 KG/M2 | DIASTOLIC BLOOD PRESSURE: 78 MMHG | WEIGHT: 240 LBS | HEART RATE: 74 BPM

## 2019-01-11 DIAGNOSIS — I10 ESSENTIAL HYPERTENSION: ICD-10-CM

## 2019-01-11 DIAGNOSIS — I71.20 THORACIC AORTIC ANEURYSM WITHOUT RUPTURE: ICD-10-CM

## 2019-01-11 DIAGNOSIS — I25.110 CORONARY ARTERY DISEASE INVOLVING NATIVE CORONARY ARTERY OF NATIVE HEART WITH UNSTABLE ANGINA PECTORIS (HCC): Primary | ICD-10-CM

## 2019-01-11 DIAGNOSIS — Z98.61 POST PTCA: ICD-10-CM

## 2019-01-11 DIAGNOSIS — E78.5 DYSLIPIDEMIA: ICD-10-CM

## 2019-01-11 PROCEDURE — 93000 ELECTROCARDIOGRAM COMPLETE: CPT | Performed by: NURSE PRACTITIONER

## 2019-01-11 PROCEDURE — 99214 OFFICE O/P EST MOD 30 MIN: CPT | Performed by: NURSE PRACTITIONER

## 2019-01-14 ENCOUNTER — HOSPITAL ENCOUNTER (OUTPATIENT)
Dept: CARDIAC REHAB | Age: 84
Setting detail: THERAPIES SERIES
Discharge: HOME | End: 2019-01-14
Payer: MEDICARE

## 2019-01-14 PROCEDURE — 93798 PHYS/QHP OP CAR RHAB W/ECG: CPT

## 2019-01-15 ENCOUNTER — HOSPITAL ENCOUNTER (OUTPATIENT)
Dept: CARDIAC REHAB | Age: 84
Setting detail: THERAPIES SERIES
Discharge: HOME | End: 2019-01-15
Payer: MEDICARE

## 2019-01-15 PROCEDURE — 93798 PHYS/QHP OP CAR RHAB W/ECG: CPT

## 2019-01-17 ENCOUNTER — HOSPITAL ENCOUNTER (OUTPATIENT)
Dept: CARDIAC REHAB | Age: 84
Setting detail: THERAPIES SERIES
Discharge: HOME | End: 2019-01-17
Payer: MEDICARE

## 2019-01-17 PROCEDURE — 93798 PHYS/QHP OP CAR RHAB W/ECG: CPT

## 2019-01-21 ENCOUNTER — HOSPITAL ENCOUNTER (OUTPATIENT)
Dept: CARDIAC REHAB | Age: 84
Setting detail: THERAPIES SERIES
Discharge: HOME | End: 2019-01-21
Payer: MEDICARE

## 2019-01-21 PROCEDURE — 93798 PHYS/QHP OP CAR RHAB W/ECG: CPT

## 2019-01-22 ENCOUNTER — HOSPITAL ENCOUNTER (OUTPATIENT)
Dept: CARDIAC REHAB | Age: 84
Setting detail: THERAPIES SERIES
Discharge: HOME | End: 2019-01-22
Payer: MEDICARE

## 2019-01-22 PROCEDURE — 93798 PHYS/QHP OP CAR RHAB W/ECG: CPT

## 2019-01-24 ENCOUNTER — HOSPITAL ENCOUNTER (OUTPATIENT)
Dept: CARDIAC REHAB | Age: 84
Setting detail: THERAPIES SERIES
Discharge: HOME | End: 2019-01-24
Payer: MEDICARE

## 2019-01-24 PROCEDURE — 93798 PHYS/QHP OP CAR RHAB W/ECG: CPT

## 2019-01-28 ENCOUNTER — HOSPITAL ENCOUNTER (OUTPATIENT)
Dept: CARDIAC REHAB | Age: 84
Setting detail: THERAPIES SERIES
Discharge: HOME | End: 2019-01-28
Payer: MEDICARE

## 2019-01-28 PROCEDURE — 93798 PHYS/QHP OP CAR RHAB W/ECG: CPT

## 2019-01-29 ENCOUNTER — HOSPITAL ENCOUNTER (OUTPATIENT)
Dept: CARDIAC REHAB | Age: 84
Setting detail: THERAPIES SERIES
Discharge: HOME | End: 2019-01-29
Payer: MEDICARE

## 2019-01-29 PROCEDURE — 93798 PHYS/QHP OP CAR RHAB W/ECG: CPT

## 2019-02-08 ENCOUNTER — HOSPITAL ENCOUNTER (OUTPATIENT)
Age: 84
Discharge: HOME | End: 2019-02-08
Payer: MEDICARE

## 2019-02-08 LAB
CHOLESTEROL: 161 MG/DL
HDLC SERPL-MCNC: 62 MG/DL
LDL CHOLESTEROL DIRECT: 93 MG/DL
TRIGL SERPL-MCNC: 78 MG/DL

## 2019-02-08 PROCEDURE — 80061 LIPID PANEL: CPT

## 2019-02-08 PROCEDURE — 36415 COLL VENOUS BLD VENIPUNCTURE: CPT

## 2019-02-08 PROCEDURE — 83721 ASSAY OF BLOOD LIPOPROTEIN: CPT

## 2019-04-12 ENCOUNTER — OFFICE VISIT (OUTPATIENT)
Dept: CARDIOLOGY CLINIC | Age: 84
End: 2019-04-12
Payer: MEDICARE

## 2019-04-12 VITALS
SYSTOLIC BLOOD PRESSURE: 130 MMHG | WEIGHT: 248.4 LBS | BODY MASS INDEX: 33.64 KG/M2 | HEIGHT: 72 IN | HEART RATE: 64 BPM | DIASTOLIC BLOOD PRESSURE: 80 MMHG

## 2019-04-12 DIAGNOSIS — I10 ESSENTIAL HYPERTENSION: ICD-10-CM

## 2019-04-12 DIAGNOSIS — I25.110 CORONARY ARTERY DISEASE INVOLVING NATIVE CORONARY ARTERY OF NATIVE HEART WITH UNSTABLE ANGINA PECTORIS (HCC): Primary | ICD-10-CM

## 2019-04-12 DIAGNOSIS — I71.20 THORACIC AORTIC ANEURYSM WITHOUT RUPTURE: ICD-10-CM

## 2019-04-12 DIAGNOSIS — Z78.9 USES SPANISH AS PRIMARY SPOKEN LANGUAGE: ICD-10-CM

## 2019-04-12 DIAGNOSIS — N52.9 ERECTILE DYSFUNCTION, UNSPECIFIED ERECTILE DYSFUNCTION TYPE: ICD-10-CM

## 2019-04-12 DIAGNOSIS — E78.5 DYSLIPIDEMIA: ICD-10-CM

## 2019-04-12 PROCEDURE — G8417 CALC BMI ABV UP PARAM F/U: HCPCS | Performed by: NURSE PRACTITIONER

## 2019-04-12 PROCEDURE — 4040F PNEUMOC VAC/ADMIN/RCVD: CPT | Performed by: NURSE PRACTITIONER

## 2019-04-12 PROCEDURE — 99213 OFFICE O/P EST LOW 20 MIN: CPT | Performed by: NURSE PRACTITIONER

## 2019-04-12 PROCEDURE — 1123F ACP DISCUSS/DSCN MKR DOCD: CPT | Performed by: NURSE PRACTITIONER

## 2019-04-12 PROCEDURE — G8427 DOCREV CUR MEDS BY ELIG CLIN: HCPCS | Performed by: NURSE PRACTITIONER

## 2019-04-12 PROCEDURE — G8598 ASA/ANTIPLAT THER USED: HCPCS | Performed by: NURSE PRACTITIONER

## 2019-04-12 PROCEDURE — 1036F TOBACCO NON-USER: CPT | Performed by: NURSE PRACTITIONER

## 2019-04-12 RX ORDER — CARVEDILOL 6.25 MG/1
3.12 TABLET ORAL 2 TIMES DAILY WITH MEALS
Qty: 60 TABLET | Refills: 11 | Status: SHIPPED | OUTPATIENT
Start: 2019-04-12 | End: 2020-05-04

## 2019-04-12 NOTE — PATIENT INSTRUCTIONS
Patient Education        Aprenda sobre la presión arterial delfino - [ Learning About High Blood Pressure ]  ¿Qué es la presión arterial delfino? La presión arterial es dasia medida de la fuerza que ejerce la adri contra las austin de las arterias. Es normal que la presión arterial suba y baje a lo lashawn del día, mele si se mantiene delfino, usted tiene la presión arterial delfino. Otro nombre para la presión arterial delfino es hipertensión. Dos números le indican fine presión arterial. El primer número indica la presión sistólica. Esta muestra qué tan charlene presiona la adri cuando el corazón está bombeando. El steven número indica la presión diastólica. Esta muestra qué tan charlene presiona la [employer] latidos, cuando el corazón está relajado y se está llenando de Aletha. Fine médico le dará un objetivo de presión arterial. El objetivo se basará en fine robbie y fine edad. La presión arterial delfino (hipertensión) significa que el número de [place] se [address], o el número de [facility] Hospital[facility] permanece alto, o ambas cosas. La presión arterial delfino aumenta el riesgo de ataque cerebral, ataque cardíaco y otros problemas. Usted y fine médico hablarán sobre los riesgos de estos problemas en relación con fine presión arterial.  ¿Qué ocurre cuando tiene presión arterial delfino? · La adri fluye por las arterias con Flordia Salvage. Con el tiempo, esto daña las austin de las arterias. Mele usted no puede sentirlo. La presión arterial delfino generalmente no causa síntomas. · La grasa y el calcio empiezan a acumularse en las arterias. Esta acumulación se llama placa. La placa hace que las arterias kenny más estrechas y [place]. La adri no puede fluir a través de ellas tan fácilmente. · Esta falta de un buen flujo de adri empieza a dañar [employer] de fine cuerpo.  [place] puede conducir a problemas gene la enfermedad de las arterias coronarias y un ataque cardíaco, insuficiencia cardíaca, ataque cerebral, insuficiencia renal y daño a los ojos. ¿Cómo puede prevenir la presión arterial delfino? · Mantenga un peso saludable. · Trate de limitar la cantidad de sodio que consume a menos de 2,300 miligramos (mg) al día. Si limita fine consumo de sodio a 1,500 mg al día, puede reducir fine presión arterial aún más. ? Compre alimentos Milly Uriarte diga \"sin sal\" (\"unsalted\"), [place] de sodio\" (\"sodium-free\") o \"bajo en sodio\" (\"low-sodium\"). Los alimentos que indiquen en la etiqueta \"reducido en sodio\" (\"reduced-sodium\") y \"poco sodio\" (\"light sodium\") aún pueden contener demasiado sodio. ? Sazone germaine comidas con ajo, jugo de coleman, cebolla, vinagre, hierbas y especias en lugar de sal. No use salsa de soya, salsa para kenzie, sal de cebolla, sal de ajo, mostaza ni ketchup (salsa de tomate) en germaine alimentos. ? Use menos sal (o nada) de lo que indique la receta. Por lo general, puede añadir la mitad de la cantidad de yelena que pide la receta sin que la comida pierda el sabor. · Manténgase activo físicamente. Niurka por lo menos 30 minutos de ejercicio la mayoría de los días de la [place]. Caminar es dasia buena opción. [place] Me desee hacer otras actividades, gene bj dubois, [employer], jugar al tenis o practicar otros deportes de equipo. · Limite el alcohol a 2 bebidas al día para los hombres y 1 bebida al día para las mujeres. · Coma muchas frutas, verduras y productos lácteos bajos en grasa. Coma menos grasas saturadas y grasas totales. ¿Cómo se trata la presión arterial delfino? · Fine médico le sugerirá hacer cambios en el estilo de tawny. Por ejemplo, es posible que fine médico le pida que coma alimentos saludables, que deje de fumar, que baje de peso y que sea [place]. · Si los cambios de estilo de tawny no ayudan lo suficiente o fine presión arterial es muy delfino, tendrá que carol [place] [employer]. La atención de seguimiento es dasia parte clave de fine tratamiento y seguridad.  Asegúrese de hacer y acudir a todas las citas, y llame a fine médico si está teniendo problemas. También es dasia buena idea saber los resultados de germaine exámenes y mantener dasia lista de los medicamentos que sharon. ¿Dónde puede encontrar más información en inglés? Moriah Spina a [email or web address] e ingrese a fine cuenta de MyChartAmirah Wolfe P501 en el Adron Pollo \"Search Health Information\" para más información (en inglés) sobre \"Aprenda sobre la presión arterial delfino - [ Learning About High Blood Pressure ]. \"     Si no tiene dasia cuenta, bella daksha en el enlace \"Sign Up Now\". Revisado: 22 julio, 2018  Versión del contenido: 11.9  © 8960-1173 Healthwise, Incorporated. Las instrucciones de cuidado fueron adaptadas bajo licencia por [facility] ([facility]). Si usted tiene [place] afección médica o sobre estas instrucciones, siempre pregunte a fine profesional de robbie. Healthwise, Incorporated niega toda garantía o responsabilidad por fine uso de esta información. Patient Education        Dieta baja en sodio (2,000 miligramos): Instrucciones de cuidado - [ Low Sodium Diet (2,000 Milligram): Care Instructions ]  Instrucciones de cuidado    Demasiado sodio hace que el organismo retenga agua en exceso. [place] puede aumentar la presión arterial y obligar al corazón y a los riñones a trabajar Les Adkins. En casos muy graves, podrían tener que hospitalizarlo. Hasta podría poner en peligro la tawny. Si limita el consumo de sodio, se sentirá mejor y reducirá fine riesgo de tener problemas graves. La ish más común de sodio es la sal. Las personas obtienen la mayor parte de la sal en ifne dieta de los alimentos enlatados, preparados y de paquete. La comida rápida y los platillos de restaurante también tienen niveles muy altos de [place]. Es probable que fine médico le limite el sodio a menos de 2,000 miligramos (mg) al día. Zeenat límite tiene en cuenta todo el sodio presente en los alimentos preparados y de [place] air force, y toda la sal que añada a germaine alimentos.   74 Linette Lynch es dasia parte clave de fine tratamiento y seguridad. Asegúrese de hacer y acudir a todas las citas, y llame a fine médico si está teniendo problemas. También es dasia buena idea saber los resultados de germaine exámenes y mantener dasia lista de los medicamentos que sharon. ¿Cómo puede cuidarse en el hogar? Joselyn las etiquetas de los alimentos  · Joselyn las etiquetas de las latas y los alimentos de paquete. [employer] qué cantidad de sodio (\"sodium\") hay en cada porción. Asegúrese de fijarse en el tamaño de la porción. Si usted come Ben, [employer] porción, consumirá The Luxury Closet. · Las etiquetas de los alimentos también indican el Porcentaje del Valor Diario (\"Percent Daily Value\") recomendado para el sodio. Escoja productos con un bajo Porcentaje del Valor Diario de [place]. · Tenga en cuenta que el sodio puede venir en otras formas además de la sal, entre las que se incluyen el glutamato monosódico (MSG, por germaine siglas en inglés), el citrato de sodio y el bicarbonato de [place]. La comida asiática frecuentemente contiene MSG añadido. Si sale a comer, a veces puede pedir que no le pongan MSG ni sal a germaine alimentos. Compre alimentos bajos en sodio  · Compre alimentos que indiquen en la etiqueta \"sin sal\" (\"unsalted\") (sin sal añadida), \"sin sodio\" (\"sodium-free\") (menos de 5 mg de sodio por porción) o \"bajo en sodio\" (\"low-sodium\") (menos de 140 mg de sodio por porción). Los alimentos que indiquen en la etiqueta \"reducido en sodio\" (\"reduced-sodium\") y \"ligero contenido de sodio\" (\"light sodium\") aún pueden contener demasiado sodio. Asegúrese de leer la etiqueta para verificar cuánto sodio está consumiendo. · Compre verduras frescas o congeladas que no tengan salsas [id number]. Compre las versiones de bajo sodio de verduras Ethan, sopas y otros productos enlatados. Prepare comidas bajas en sodio  · Reduzca la cantidad de sal que [place] para cocinar. [place] le ayudará a acostumbrarse al [employer]. No añada yelena después de charlene cocinado. Dasia cucharadita de sal contiene alrededor de 2,300 mg de sodio. · Retire el salero de la ferrara. · Sazone germaine comidas con ajo, jugo de coleman, cebolla, vinagre, hierbas y especias. No use salsa de soya, salsa de soya \"lite\", salsa para sharon, sal de cebolla, sal de ajo o de apio, mostaza ni ketchup (salsa de tomate) en germaine comidas. · Use aderezos para ensaladas, salsas y ketchup de bajo contenido de [place]. O prepare germaine propios aderezos para ensaladas y salsas sin añadir sal.  · Use menos sal (o nada) cuando la receta lo pida. Por lo general puede añadir la mitad de la cantidad de yelena que pide la receta sin que esta pierda el sabor. Otros alimentos gene el arroz, las pastas y los cereales no necesitan sal adicional.  · Enjuague las verduras enlatadas y cocínelas en agua fresca. [place] le alberta algo de sal, mekhi no toda. · Evite el agua que naturalmente tenga un alto contenido de sodio o que haya sido tratada con ablandadores, los cuales TUMBY [place]. Llame a fine compañía de agua local para averiguar cuál es el contenido de sodio del agua. Si compra agua embotellada, shayy la etiqueta y escoja dasia sophie sin sodio. Evite los alimentos altos en sodio  · Evite comer:  ? Sharon, pescados y aves [place], curados, salados y [place]. ? Chance Bilberry, perros calientes (\"hot dogs\") y sharon frías. ? Queso común, ahsan y procesado y [place] de cacahuate (maní) común. ? Seldon Sick y otros refrigerios salados gene \"pretzels\", \"chips\" (gene emma fritas) y palomitas de maíz saladas. ? Comidas preparadas y congeladas, a menos que tengan dasia etiqueta que diga \"bajo en sodio\" (\"low-sodium\"). ? Sang Ditty y consomés enlatados y secos o deshidratados, a menos que digan \"sin sodio\" (\"sodium-free\") o \"bajo en sodio\" (\"low-sodium\"). ? Verduras enlatadas, a menos que digan \"sin sodio\" (\"sodium-free\") o \"bajo en sodio\" (\"low-sodium\"). ? Emma fritas (a la francesa), pizzas, tacos y otras comidas rápidas.   ? Pepinillos, aceitunas, ketchup y otros condimentos, en especial la salsa de soya, a menos que digan \"sin sodio\" (\"sodium-free\") o \"bajo en sodio\" (\"low-sodium\"). ¿Dónde puede encontrar más información en inglés? Debbie Terrazas a [email or web address] e ingrese a fine cuenta de MyChart. Lanie Reenicolle S253 en el cuadro \"Search Health Information\" para más información (en inglés) sobre \"Dieta baja en sodio (2,000 miligramos): Instrucciones de cuidado - [ Low Sodium Diet (2,000 Milligram): Care Instructions ]. \"     Si no tiene dasia cuenta, bella daksha en el enlace \"Sign Up Now\". Revisado: [address], 2018  Versión del contenido: 11.9  © 6631-1307 Healthwise, Incorporated. Las instrucciones de cuidado fueron adaptadas bajo licencia por [facility] ([facility]). Si usted tiene [place] afección médica o sobre estas instrucciones, siempre pregunte a fine profesional de robbie. Healthwise, Incorporated niega toda garantía o responsabilidad por fine uso de esta información.

## 2019-04-14 PROBLEM — Z78.9 USES SPANISH AS PRIMARY SPOKEN LANGUAGE: Status: ACTIVE | Noted: 2018-02-14

## 2019-04-15 DIAGNOSIS — I25.110 CORONARY ARTERY DISEASE INVOLVING NATIVE CORONARY ARTERY OF NATIVE HEART WITH UNSTABLE ANGINA PECTORIS (HCC): ICD-10-CM

## 2019-04-15 NOTE — PROGRESS NOTES
simvastatin (ZOCOR) 40 MG tablet Take 1 tablet by mouth nightly 30 tablet 11    omeprazole (PRILOSEC) 40 MG delayed release capsule TAKE 1 CAPSULE BY MOUTH IN THE MORNING 90 capsule 11    furosemide (LASIX) 20 MG tablet Take 1 tablet by mouth 2 times daily 60 tablet 11    tamsulosin (FLOMAX) 0.4 MG capsule Take 2 capsules by mouth nightly 60 capsule 11    umeclidinium-vilanterol (ANORO ELLIPTA) 62.5-25 MCG/INH AEPB inhaler Inhale 1 puff into the lungs daily 1 each 11    nitroGLYCERIN (NITROSTAT) 0.4 MG SL tablet Place 1 tablet under the tongue every 5 minutes as needed for Chest pain 25 tablet 3    aspirin 81 MG chewable tablet Take 1 tablet by mouth daily 30 tablet 3     No current facility-administered medications for this visit. Allergies: Ace inhibitors  Past Medical History:   Diagnosis Date    Abnormal PFTs (pulmonary function tests)     PFT 10/09 3.21 (98%) / 4.97 (115%)=0.65    BPH     Mod BPH 40 gm prostate- Cystourethroscopy 4/2010- Dr. Franks Serve    COPD, [facility]) 2/28/2018    Coronary artery disease involving native coronary artery of native heart with unstable angina pectoris ([facility] [address]     Depression     Pt has refused Rx in the past    DJD (degenerative joint disease), lumbar     CT scan 3/29/2010    Dyspnea on exertion 10/4/2018    Former smoker 2/28/2018    H/O echocardiogram 05/11/2018    EF 50-60%, Mild LVH, Grade 1 diastolic dysfunction, Sclerotice but non-stenotic AV.  H/O echocardiogram 09/10/2018    Limited- EF 50-55%, AV sclerotic but not stenotic, visualized MR, TR, and NV. Dilated aortic root.  History of nuclear stress test 08/07/2018    cardiolite-normal    Hx of cardiovascular stress test 05/11/2018    EF 52%, Abn stress images, Mild small-size lateral wall perfusion, Mod inferior wall ischemia.     Hypertension 1996    Insomnia     Kidney stones     CT scan 3/29/2010    Liver cyst     CT scan 3/29/2010    Lumbar disc disease 8/2012    Dr. Phan Presume, will refer to Dr. Josie Collazo for eval    Lumbar stenosis     CT scan 3/29/2010    Nocturia     Orchitis and epididymitis, unspecified 2012    Dr. Lorraine Washington    Pain in left testicle     Pancreatic cyst     CT scan 3/29/2010    Spinal stenosis 2012    severe-Dr. Yoan Suarez Suicide attempt [facility]) 3/2005    drug overdose with  Zestoretic    Tachycardia     \"saw Dr , not sure of his name, did tests and did not find anything\"\"that was about 4 months ago\"\"no more episodes since had work up\"    Ulcer     hx of ulcers\"three years ago but ok now\"(pc)     Past Surgical History:   Procedure Laterality Date    CHOLECYSTECTOMY  42 yrs ago    CYSTOSCOPY  2010    ENDOSCOPY, COLON, DIAGNOSTIC      EYE SURGERY      cataract ext carrlol    HERNIA REPAIR  over 12 yrs ago    Bilateral inguinal hernia repair    [facility]    PULMONARY STRESS TEST  3/1/12    Normal; ejection fraction 70%; exercise capacity 4.6 METS    TONSILLECTOMY       History reviewed. No pertinent family history. Social History     Tobacco Use    Smoking status: Former Smoker     Packs/day: 2.50     Years: 65.00     Pack years: 162.50     Last attempt to quit: 2009     Years since quittin.6    Smokeless tobacco: Never Used    Tobacco comment: Pt smoked for 65 yrs. Quit 9 yrs ago. Updated 18   Substance Use Topics    Alcohol use: No     Alcohol/week: 2.5 oz     Types: 5 Standard drinks or equivalent per week        Review of Systems - History obtained from the patient  General: negative for - malaise, night sweats, weight gain, + fatigue.   Psychological: negative for - anxiety, depression, sleep disturbances  Ophthalmic: negative for - blurry vision, loss of vision  ENT: negative for - headaches, vertigo, visual changes  Hematological and Lymphatic: negative for - bleeding problems, blood clots, bruising, fatigue or pallor  Endocrine: negative for - malaise/lethargy, palpitations, unexpected weight changes  Respiratory: negative for - cough, orthopnea, shortness of breath or tachypnea  Cardiovascular: negative for - chest pain, dyspnea on exertion, edema or palpitations  Gastrointestinal: no abdominal pain, change in bowel habits, or black or bloody stools  Genito-Urinary: no dysuria, trouble voiding, or hematuria. He is having difficulty with getting an erection. Musculoskeletal: negative for - gait disturbance, pain, swelling   Neurological: negative for - confusion, dizziness, impaired coordination/balance or numbness/tingling    Objective:      Physical Exam:  /80   Pulse 64   Ht 6' (1.829 m)   Wt 248 lb 6.4 oz (112.7 kg)   BMI 33.69 kg/m²   Wt Readings from Last 3 Encounters:   04/12/19 248 lb 6.4 oz (112.7 kg)   01/11/19 240 lb (108.9 kg)   11/06/18 249 lb 3.2 oz (113 kg)     Body mass index is 33.69 kg/m². GENERAL - Alert, oriented, pleasant, in no apparent distress. Head unremarkable  Eyes - pupils equal and reactive to light - bilateral conjunctiva are pink: sclera are white   ENT - external ears intact, nose is intact:  tongue is midline pink and moist  Neck - Supple. No jugular venous distention noted. No carotid bruits appreciated. Cardiovascular - Normal S1 and S2:  murmur appreciated No, No gallop. Regular rate- Yes,  rhythm regular-Yes. Extremities - No cyanosis, clnoubbing, no edema to lower legs. Pulmonary - No respiratory distress. No wheezes or rales. Chest is clear  Pulses: Bilateral radial and pedal pulses normal  Abdomen -  Soft no tenderness, non distended   Musculoskeletal - Normal movement of all extremities   Neurologic - alert and oriented: There are no gross focal neurologic abnormalities.    Skin-  No rash: No echymosis   Affect- normal mood and pleasant       DATA:  No results found for: CKTOTAL, CKMB, CKMBINDEX, TROPONINI  BNP:  No results found for: BNP  PT/INR:  No results found for: PTINR  No results found for: LABA1C  Lab Results   Component Value Date    CHOL 161 02/08/2019    TRIG 78 02/08/2019    HDL 62 02/08/2019    LDLCALC 131 (H) 03/02/2012    LDLDIRECT 93 02/08/2019     Lab Results   Component Value Date     (H) 07/16/2018     (H) 07/16/2018     TSH:    Lab Results   Component Value Date    TSH 5.27 04/03/2018         Assessment/ Plan:       CAD  Sp PCI   Stable    Current meds include:   continue with medications    Last stress test 8/7/2018 Summary   ECG portion of stress test is negative for ischemia by diagnostic criteria.   Decreased uptake inferiorly due to diaphragmatic artifact.   Normal EF 54 % with normal ventricular contractility.   Normal stress myocardial perfusion. No infarct or ischemia noted.   This is a normal study. HTN     Controlled   To cont same medications   advised low salt diet written education given in Polish.  Last echo 9/10/2018 showed EF of 50-55% with    Aortic valve appear sclerotic but not stenotic.   Mitral regurgitation visualized.   Tricuspid regurgitation visualized.   Pulmonic regurgitation present.   Aortic root dilation noted in previous ultrasound 4.1cm remains stable .   No evidence of pericardial effusion. Hyperlipidemia    Labs noted from 2/8/2019  HDL 62  LDL 93     Pt is at goal   Patient is on statin     ED and fatigue  · Decrease coreg to 3.125mg BID  · Follow up in 1 month for OV to discuss if symptoms are improved. Patient seen, interviewed and examined. Testing was reviewed. Patient is encouraged to exercise even a brisk walk for 30 minutes at least 3 to 4 times a week. Lifestyle and risk factor modificatons discussed. Various goals are discussed and questions answered. Continue current medications. Appropriate prescriptions are addressed. Questions answered and patient verbalizes understanding. Call for any problems, questions, or concerns.     Pt is to follow up in 1 months for Cardiac management    Electronically signed by ERIS Pierre CNP on 4/14/2019 at 8:29 PM

## 2019-04-16 RX ORDER — SIMVASTATIN 40 MG
40 TABLET ORAL NIGHTLY
Qty: 90 TABLET | Refills: 3 | Status: SHIPPED | OUTPATIENT
Start: 2019-04-16 | End: 2020-06-28 | Stop reason: SDUPTHER

## 2019-05-14 ENCOUNTER — TELEPHONE (OUTPATIENT)
Dept: FAMILY MEDICINE CLINIC | Age: 84
End: 2019-05-14

## 2019-06-03 ENCOUNTER — OFFICE VISIT (OUTPATIENT)
Dept: FAMILY MEDICINE CLINIC | Age: 84
End: 2019-06-03
Payer: MEDICARE

## 2019-06-03 VITALS — SYSTOLIC BLOOD PRESSURE: 172 MMHG | HEART RATE: 76 BPM | DIASTOLIC BLOOD PRESSURE: 100 MMHG | OXYGEN SATURATION: 92 %

## 2019-06-03 DIAGNOSIS — Z78.9 USES SPANISH AS PRIMARY SPOKEN LANGUAGE: ICD-10-CM

## 2019-06-03 DIAGNOSIS — K21.9 GASTROESOPHAGEAL REFLUX DISEASE, ESOPHAGITIS PRESENCE NOT SPECIFIED: ICD-10-CM

## 2019-06-03 DIAGNOSIS — J44.9 COPD, MILD (HCC): Primary | ICD-10-CM

## 2019-06-03 DIAGNOSIS — G89.4 CHRONIC PAIN SYNDROME: ICD-10-CM

## 2019-06-03 DIAGNOSIS — N40.1 BENIGN PROSTATIC HYPERPLASIA WITH LOWER URINARY TRACT SYMPTOMS, SYMPTOM DETAILS UNSPECIFIED: ICD-10-CM

## 2019-06-03 DIAGNOSIS — I10 ESSENTIAL HYPERTENSION: ICD-10-CM

## 2019-06-03 DIAGNOSIS — I25.110 CORONARY ARTERY DISEASE INVOLVING NATIVE CORONARY ARTERY OF NATIVE HEART WITH UNSTABLE ANGINA PECTORIS (HCC): ICD-10-CM

## 2019-06-03 PROCEDURE — G8427 DOCREV CUR MEDS BY ELIG CLIN: HCPCS | Performed by: FAMILY MEDICINE

## 2019-06-03 PROCEDURE — G8598 ASA/ANTIPLAT THER USED: HCPCS | Performed by: FAMILY MEDICINE

## 2019-06-03 PROCEDURE — 1123F ACP DISCUSS/DSCN MKR DOCD: CPT | Performed by: FAMILY MEDICINE

## 2019-06-03 PROCEDURE — 1036F TOBACCO NON-USER: CPT | Performed by: FAMILY MEDICINE

## 2019-06-03 PROCEDURE — G8926 SPIRO NO PERF OR DOC: HCPCS | Performed by: FAMILY MEDICINE

## 2019-06-03 PROCEDURE — 3023F SPIROM DOC REV: CPT | Performed by: FAMILY MEDICINE

## 2019-06-03 PROCEDURE — 4040F PNEUMOC VAC/ADMIN/RCVD: CPT | Performed by: FAMILY MEDICINE

## 2019-06-03 PROCEDURE — 99214 OFFICE O/P EST MOD 30 MIN: CPT | Performed by: FAMILY MEDICINE

## 2019-06-03 PROCEDURE — G8417 CALC BMI ABV UP PARAM F/U: HCPCS | Performed by: FAMILY MEDICINE

## 2019-06-03 RX ORDER — OMEPRAZOLE 40 MG/1
40 CAPSULE, DELAYED RELEASE ORAL EVERY MORNING
Qty: 90 CAPSULE | Refills: 11 | Status: SHIPPED | OUTPATIENT
Start: 2019-06-03 | End: 2020-07-09 | Stop reason: SDUPTHER

## 2019-06-03 RX ORDER — DULOXETIN HYDROCHLORIDE 20 MG/1
20 CAPSULE, DELAYED RELEASE ORAL DAILY
Qty: 60 CAPSULE | Refills: 11 | Status: SHIPPED | OUTPATIENT
Start: 2019-06-03 | End: 2020-06-08

## 2019-06-03 RX ORDER — GUAIFENESIN 600 MG/1
600 TABLET, EXTENDED RELEASE ORAL 2 TIMES DAILY
Qty: 60 TABLET | Refills: 1 | Status: SHIPPED | OUTPATIENT
Start: 2019-06-03 | End: 2020-12-18

## 2019-06-03 RX ORDER — AMLODIPINE BESYLATE 5 MG/1
10 TABLET ORAL DAILY
Qty: 60 TABLET | Refills: 11 | Status: SHIPPED | OUTPATIENT
Start: 2019-06-03 | End: 2020-06-03

## 2019-06-03 RX ORDER — TAMSULOSIN HYDROCHLORIDE 0.4 MG/1
0.8 CAPSULE ORAL NIGHTLY
Qty: 60 CAPSULE | Refills: 11 | Status: SHIPPED | OUTPATIENT
Start: 2019-06-03 | End: 2019-12-17 | Stop reason: SDUPTHER

## 2019-06-03 RX ORDER — FUROSEMIDE 20 MG/1
20 TABLET ORAL 2 TIMES DAILY
Qty: 60 TABLET | Refills: 11 | Status: SHIPPED | OUTPATIENT
Start: 2019-06-03

## 2019-06-03 RX ORDER — FINASTERIDE 5 MG/1
5 TABLET, FILM COATED ORAL DAILY
Qty: 30 TABLET | Refills: 11 | Status: SHIPPED | OUTPATIENT
Start: 2019-06-03 | End: 2019-07-02 | Stop reason: SDUPTHER

## 2019-06-03 RX ORDER — CLOPIDOGREL BISULFATE 75 MG/1
75 TABLET ORAL DAILY
Qty: 30 TABLET | Refills: 11 | Status: SHIPPED | OUTPATIENT
Start: 2019-06-03 | End: 2020-06-08

## 2019-06-03 NOTE — PROGRESS NOTES
other ROS negative     reports that he quit smoking about 9 years ago. He has a 162.50 pack-year smoking history. He has never used smokeless tobacco.   reports that he does not drink alcohol. Physical Exam   Nursing note reviewed  BP (!) 172/100 (Site: Left Upper Arm, Position: Sitting, Cuff Size: Medium Adult)   Pulse 76   SpO2 92%   BP Readings from Last 3 Encounters:   06/03/19 (!) 172/100   04/12/19 130/80   01/11/19 134/78     Wt Readings from Last 3 Encounters:   04/12/19 248 lb 6.4 oz (112.7 kg)   01/11/19 240 lb (108.9 kg)   11/06/18 249 lb 3.2 oz (113 kg)     Constitutional: He is oriented to person, place, and time   Neck: Trachea normal. Neck supple. Cardiovascular: Normal rate, regular rhythm, S1 normal, S2 normal and normal heart sounds. No murmur heard. Pulmonary/Chest: Hyperresonant breath sounds decreased at bases. No accessory muscle usage. No respiratory distress. . He  has no wheezes or ronchi. Abdominal: Soft. Normal appearance and bowel sounds are normal.   Neurological: He  is alert and oriented to person, place, and time. no CN deficits. Skin: He  is not diaphoretic. Psychiatric: He  has a normal mood and affect. MSK: venous stasis changes to both lower shins.   1+ pitting edema     Assessment and Plan: See above

## 2019-07-02 ENCOUNTER — OFFICE VISIT (OUTPATIENT)
Dept: FAMILY MEDICINE CLINIC | Age: 84
End: 2019-07-02
Payer: MEDICARE

## 2019-07-02 VITALS
HEIGHT: 72 IN | HEART RATE: 94 BPM | DIASTOLIC BLOOD PRESSURE: 84 MMHG | SYSTOLIC BLOOD PRESSURE: 128 MMHG | WEIGHT: 244.4 LBS | OXYGEN SATURATION: 92 % | BODY MASS INDEX: 33.1 KG/M2

## 2019-07-02 DIAGNOSIS — J44.9 COPD, MILD (HCC): Primary | ICD-10-CM

## 2019-07-02 DIAGNOSIS — R21 PAPULAR RASH: ICD-10-CM

## 2019-07-02 DIAGNOSIS — Z23 NEED FOR PNEUMOCOCCAL VACCINATION: ICD-10-CM

## 2019-07-02 DIAGNOSIS — N40.1 BENIGN PROSTATIC HYPERPLASIA WITH LOWER URINARY TRACT SYMPTOMS, SYMPTOM DETAILS UNSPECIFIED: ICD-10-CM

## 2019-07-02 DIAGNOSIS — I10 ESSENTIAL HYPERTENSION: ICD-10-CM

## 2019-07-02 PROCEDURE — G8417 CALC BMI ABV UP PARAM F/U: HCPCS | Performed by: FAMILY MEDICINE

## 2019-07-02 PROCEDURE — 90732 PPSV23 VACC 2 YRS+ SUBQ/IM: CPT | Performed by: FAMILY MEDICINE

## 2019-07-02 PROCEDURE — G8926 SPIRO NO PERF OR DOC: HCPCS | Performed by: FAMILY MEDICINE

## 2019-07-02 PROCEDURE — G8427 DOCREV CUR MEDS BY ELIG CLIN: HCPCS | Performed by: FAMILY MEDICINE

## 2019-07-02 PROCEDURE — G8598 ASA/ANTIPLAT THER USED: HCPCS | Performed by: FAMILY MEDICINE

## 2019-07-02 PROCEDURE — 1123F ACP DISCUSS/DSCN MKR DOCD: CPT | Performed by: FAMILY MEDICINE

## 2019-07-02 PROCEDURE — G0009 ADMIN PNEUMOCOCCAL VACCINE: HCPCS | Performed by: FAMILY MEDICINE

## 2019-07-02 PROCEDURE — 4040F PNEUMOC VAC/ADMIN/RCVD: CPT | Performed by: FAMILY MEDICINE

## 2019-07-02 PROCEDURE — 3023F SPIROM DOC REV: CPT | Performed by: FAMILY MEDICINE

## 2019-07-02 PROCEDURE — 99214 OFFICE O/P EST MOD 30 MIN: CPT | Performed by: FAMILY MEDICINE

## 2019-07-02 PROCEDURE — 1036F TOBACCO NON-USER: CPT | Performed by: FAMILY MEDICINE

## 2019-07-02 RX ORDER — FINASTERIDE 5 MG/1
5 TABLET, FILM COATED ORAL DAILY
Qty: 30 TABLET | Refills: 11 | Status: SHIPPED | OUTPATIENT
Start: 2019-07-02 | End: 2020-07-09 | Stop reason: SDUPTHER

## 2019-07-02 ASSESSMENT — PATIENT HEALTH QUESTIONNAIRE - PHQ9
SUM OF ALL RESPONSES TO PHQ QUESTIONS 1-9: 0
SUM OF ALL RESPONSES TO PHQ QUESTIONS 1-9: 0
2. FEELING DOWN, DEPRESSED OR HOPELESS: 0
SUM OF ALL RESPONSES TO PHQ9 QUESTIONS 1 & 2: 0
1. LITTLE INTEREST OR PLEASURE IN DOING THINGS: 0

## 2019-11-16 DIAGNOSIS — I25.110 CORONARY ARTERY DISEASE INVOLVING NATIVE CORONARY ARTERY OF NATIVE HEART WITH UNSTABLE ANGINA PECTORIS (HCC): ICD-10-CM

## 2019-11-19 RX ORDER — SIMVASTATIN 40 MG
TABLET ORAL
Qty: 30 TABLET | Refills: 3 | Status: SHIPPED | OUTPATIENT
Start: 2019-11-19 | End: 2020-01-06 | Stop reason: SDUPTHER

## 2019-12-17 DIAGNOSIS — N40.1 BENIGN PROSTATIC HYPERPLASIA WITH LOWER URINARY TRACT SYMPTOMS, SYMPTOM DETAILS UNSPECIFIED: ICD-10-CM

## 2019-12-17 RX ORDER — TAMSULOSIN HYDROCHLORIDE 0.4 MG/1
0.8 CAPSULE ORAL NIGHTLY
Qty: 60 CAPSULE | Refills: 11 | Status: SHIPPED | OUTPATIENT
Start: 2019-12-17 | End: 2021-11-24 | Stop reason: SDUPTHER

## 2020-01-06 ENCOUNTER — OFFICE VISIT (OUTPATIENT)
Dept: FAMILY MEDICINE CLINIC | Age: 85
End: 2020-01-06
Payer: MEDICARE

## 2020-01-06 VITALS
OXYGEN SATURATION: 99 % | RESPIRATION RATE: 16 BRPM | BODY MASS INDEX: 32.8 KG/M2 | HEART RATE: 92 BPM | HEIGHT: 72 IN | DIASTOLIC BLOOD PRESSURE: 76 MMHG | WEIGHT: 242.2 LBS | SYSTOLIC BLOOD PRESSURE: 128 MMHG

## 2020-01-06 PROCEDURE — 99214 OFFICE O/P EST MOD 30 MIN: CPT | Performed by: FAMILY MEDICINE

## 2020-01-06 RX ORDER — AZITHROMYCIN 250 MG/1
TABLET, FILM COATED ORAL
Qty: 6 TABLET | Refills: 0 | Status: SHIPPED | OUTPATIENT
Start: 2020-01-06 | End: 2020-01-16

## 2020-01-06 RX ORDER — PREDNISONE 20 MG/1
40 TABLET ORAL DAILY
Qty: 10 TABLET | Refills: 0 | Status: SHIPPED | OUTPATIENT
Start: 2020-01-06 | End: 2020-01-11

## 2020-01-06 NOTE — PROGRESS NOTES
Diagnosis Assessment and Associated Orders:     Diagnosis Orders   1. COPD, mild ([address] - possibly early flare. meds as ordered. cotinue current inhalers azithromycin (ZITHROMAX) 250 MG tablet    predniSONE (DELTASONE) 20 MG tablet   2. Thoracic aortic aneurysm without rupture (HCC) - asymptomatic. Check U/S for this year [address]   3. Renal cyst - check U/S for this year. US ABDOMEN COMPLETE   4. Varicose veins of both lower extremities with pain - referral generated. Encouraged compression stocking. ([facility]) - Ryan Thornton MD, General Surgery, [facility]   5. Essential hypertension - stable back on coreg, lasix, and amlodipine. All patient questions answered. Pt voiced understanding. Return in about 6 months (around 7/6/2020) for Med refills. -----------------------------------------------------------------------------------------------        Of note: Patient is Swedish-speaking only with very little [place] speaking ability. During this interview in visit we did use  service. Chief Complaint   Patient presents with    6 Month Follow-Up    COPD   reports slightly more productive sputum over the last few weeks. Compliant with current inhalers for COPD. Tolerating lasix and coreg again without side effects. Would like referral for vein specialists as his varciose veins are painful again in the legs s/p vein procedure over 15 years ago.        No results found for: LABA1C  Lab Results   Component Value Date    LABMICR Not Indicated 11/21/2014    CREATININE 0.9 07/16/2018     Lab Results   Component Value Date     (H) 07/16/2018     (H) 07/16/2018     Lab Results   Component Value Date    CHOL 161 02/08/2019    TRIG 78 02/08/2019    HDL 62 02/08/2019    LDLCALC 131 (H) 03/02/2012    LDLDIRECT 93 02/08/2019            PHQ Scores 7/2/2019 11/6/2018 12/15/2017   PHQ2 Score 0 1 1   PHQ9 Score 0 1 1     Interpretation of Total Score Depression Severity: 1-4 = Minimal depression, 5-9 = Mild depression, 10-14 = Moderate depression, 15-19 = Moderately severe depression, 20-27 = Severe depression      ROS: Pertinent items are noted in HPI. All other ROS negative     reports that he quit smoking about 10 years ago. He has a 162.50 pack-year smoking history. He has never used smokeless tobacco.   reports no history of alcohol use. Physical Exam   Nursing note reviewed  /76 (Site: Left Upper Arm, Position: Sitting, Cuff Size: Large Adult)   Pulse 92   Resp 16   Ht 6' (1.829 m)   Wt 242 lb 3.2 oz (109.9 kg)   SpO2 99%   BMI 32.85 kg/m²   BP Readings from Last 3 Encounters:   01/06/20 128/76   07/02/19 128/84   06/03/19 (!) 172/100     Wt Readings from Last 3 Encounters:   01/06/20 242 lb 3.2 oz (109.9 kg)   07/02/19 244 lb 6.4 oz (110.9 kg)   04/12/19 248 lb 6.4 oz (112.7 kg)     Constitutional: He is oriented to person, place, and time, here with  Daniel Solitario  . Neck: Trachea normal. Neck supple. Cardiovascular: Normal rate, regular rhythm, S1 normal, S2 normal and normal heart sounds. No murmur heard. Pulmonary/Chest: Hyperresonant breath sounds decreased at bases. No accessory muscle usage. No respiratory distress. [place] He faint wheezes right lower lung based. No ronchi. Abdominal: Soft. Normal appearance and bowel sounds are normal.   Neurological: He  is alert and oriented to person, place, and time. no CN deficits. Skin: He  is not diaphoretic. Psychiatric: He  has a normal mood and affect. MSK: venous stasis changes to both lower shins. + moderate varicose veins right worse than left medial calves.   1+ pitting edema     Assessment and Plan: See above

## 2020-01-09 ENCOUNTER — HOSPITAL ENCOUNTER (OUTPATIENT)
Dept: ULTRASOUND IMAGING | Age: 85
Discharge: HOME | End: 2020-01-09
Payer: MEDICARE

## 2020-01-09 PROCEDURE — 76700 US EXAM ABDOM COMPLETE: CPT

## 2020-01-16 ENCOUNTER — TELEPHONE (OUTPATIENT)
Dept: FAMILY MEDICINE CLINIC | Age: 85
End: 2020-01-16

## 2020-01-16 NOTE — TELEPHONE ENCOUNTER
Received call from Dr Veronica Galo [phone] requesting information on patients history and DX. Patient went to their office to have dental exam and workup but did not have interpretor so unable to translate any information. Dr Lisa Minaya states their fax machine is down at this time so unable to fax any needed information but he will call back next week when patient is to return to their office.

## 2020-01-29 ENCOUNTER — TELEPHONE (OUTPATIENT)
Dept: FAMILY MEDICINE CLINIC | Age: 85
End: 2020-01-29

## 2020-01-30 RX ORDER — AMOXICILLIN 500 MG/1
2000 CAPSULE ORAL ONCE
Qty: 4 CAPSULE | Refills: 0 | Status: SHIPPED | OUTPATIENT
Start: 2020-01-30 | End: 2020-01-30 | Stop reason: SDUPTHER

## 2020-01-30 RX ORDER — AMOXICILLIN 500 MG/1
2000 CAPSULE ORAL ONCE
Qty: 20 CAPSULE | Refills: 0 | Status: SHIPPED | OUTPATIENT
Start: 2020-01-30 | End: 2020-01-30

## 2020-01-30 NOTE — TELEPHONE ENCOUNTER
Dr. Patrice Montes called stating he would like to know if pcp can call in the RX for Amoxicillin 500mg for qty of 20 tabs . 4 tabs 1 hour prior to appt.  No refills

## 2020-05-04 ENCOUNTER — TELEPHONE (OUTPATIENT)
Dept: CARDIOLOGY CLINIC | Age: 85
End: 2020-05-04

## 2020-05-04 NOTE — TELEPHONE ENCOUNTER
[facility] called patient is out of his Coreg   Refill was requested Friday they did   Provide patient with a few pills

## 2020-06-03 RX ORDER — AMLODIPINE BESYLATE 5 MG/1
TABLET ORAL
Qty: 60 TABLET | Refills: 11 | Status: SHIPPED | OUTPATIENT
Start: 2020-06-03 | End: 2020-07-09 | Stop reason: SDUPTHER

## 2020-06-04 RX ORDER — AMLODIPINE BESYLATE 5 MG/1
TABLET ORAL
Qty: 60 TABLET | Refills: 11 | OUTPATIENT
Start: 2020-06-04

## 2020-06-08 RX ORDER — DULOXETIN HYDROCHLORIDE 20 MG/1
CAPSULE, DELAYED RELEASE ORAL
Qty: 30 CAPSULE | Refills: 11 | Status: SHIPPED | OUTPATIENT
Start: 2020-06-08 | End: 2021-10-18

## 2020-06-08 RX ORDER — CLOPIDOGREL BISULFATE 75 MG/1
TABLET ORAL
Qty: 30 TABLET | Refills: 11 | Status: SHIPPED | OUTPATIENT
Start: 2020-06-08 | End: 2021-06-25

## 2020-06-28 RX ORDER — SIMVASTATIN 40 MG
40 TABLET ORAL NIGHTLY
Qty: 90 TABLET | Refills: 3 | Status: SHIPPED | OUTPATIENT
Start: 2020-06-28 | End: 2020-07-09 | Stop reason: SDUPTHER

## 2020-07-09 RX ORDER — OMEPRAZOLE 40 MG/1
40 CAPSULE, DELAYED RELEASE ORAL EVERY MORNING
Qty: 90 CAPSULE | Refills: 3 | Status: SHIPPED | OUTPATIENT
Start: 2020-07-09 | End: 2021-08-09 | Stop reason: SDUPTHER

## 2020-07-09 RX ORDER — AMLODIPINE BESYLATE 5 MG/1
TABLET ORAL
Qty: 180 TABLET | Refills: 3 | Status: SHIPPED | OUTPATIENT
Start: 2020-07-09 | End: 2020-09-16 | Stop reason: SDUPTHER

## 2020-07-09 RX ORDER — SIMVASTATIN 40 MG
40 TABLET ORAL NIGHTLY
Qty: 90 TABLET | Refills: 3 | Status: SHIPPED | OUTPATIENT
Start: 2020-07-09 | End: 2021-10-15 | Stop reason: SDUPTHER

## 2020-07-09 RX ORDER — FINASTERIDE 5 MG/1
5 TABLET, FILM COATED ORAL DAILY
Qty: 90 TABLET | Refills: 3 | Status: SHIPPED | OUTPATIENT
Start: 2020-07-09 | End: 2021-11-24 | Stop reason: SDUPTHER

## 2020-08-14 ENCOUNTER — OFFICE VISIT (OUTPATIENT)
Dept: FAMILY MEDICINE CLINIC | Age: 85
End: 2020-08-14
Payer: MEDICARE

## 2020-08-14 VITALS
HEIGHT: 72 IN | WEIGHT: 248 LBS | SYSTOLIC BLOOD PRESSURE: 148 MMHG | BODY MASS INDEX: 33.59 KG/M2 | HEART RATE: 77 BPM | OXYGEN SATURATION: 98 % | DIASTOLIC BLOOD PRESSURE: 80 MMHG

## 2020-08-14 DIAGNOSIS — I50.32 CHRONIC DIASTOLIC (CONGESTIVE) HEART FAILURE (HCC): ICD-10-CM

## 2020-08-14 DIAGNOSIS — I10 ESSENTIAL HYPERTENSION: ICD-10-CM

## 2020-08-14 DIAGNOSIS — I51.9 MILD DIASTOLIC DYSFUNCTION: ICD-10-CM

## 2020-08-14 DIAGNOSIS — M79.89 BILATERAL SWELLING OF FEET: ICD-10-CM

## 2020-08-14 LAB
ANION GAP SERPL CALCULATED.3IONS-SCNC: 10 MMOL/L (ref 3–16)
BUN BLDV-MCNC: 21 MG/DL (ref 7–20)
CALCIUM SERPL-MCNC: 9.5 MG/DL (ref 8.3–10.6)
CHLORIDE BLD-SCNC: 103 MMOL/L (ref 99–110)
CO2: 27 MMOL/L (ref 21–32)
CREAT SERPL-MCNC: 1.2 MG/DL (ref 0.8–1.3)
GFR AFRICAN AMERICAN: >60
GFR NON-AFRICAN AMERICAN: 58
GLUCOSE BLD-MCNC: 104 MG/DL (ref 70–99)
POTASSIUM SERPL-SCNC: 4.8 MMOL/L (ref 3.5–5.1)
PRO-BNP: 107 PG/ML (ref 0–449)
SODIUM BLD-SCNC: 140 MMOL/L (ref 136–145)

## 2020-08-14 PROCEDURE — 99213 OFFICE O/P EST LOW 20 MIN: CPT | Performed by: NURSE PRACTITIONER

## 2020-08-14 RX ORDER — FUROSEMIDE 20 MG/1
20 TABLET ORAL DAILY
Qty: 90 TABLET | Refills: 0 | Status: SHIPPED | OUTPATIENT
Start: 2020-08-14 | End: 2020-08-18 | Stop reason: SDUPTHER

## 2020-08-14 ASSESSMENT — ENCOUNTER SYMPTOMS
COLOR CHANGE: 0
COUGH: 1
SHORTNESS OF BREATH: 1

## 2020-08-14 NOTE — PATIENT INSTRUCTIONS
Take lasix 20mg three times daily for one week and then start lasix 20mg twice daily as you were before. This should help feet swelling. Keep legs elevated on a chair if you are sitting. Feet need to stay off of the floor to help swelling. Do not add any salt to food. This makes blood pressure higher and swelling worse. Please come to the office and ask for refill or ask the pharmacy and I will refill it. I ordered compression socks for you to wear EVERY SINGLE DAY WHILE AWAKE. This will help with swelling. I will call you after your blood results return. Some time next week. We will schedule a follow up appointment in office during that phone call. GO TO THE ER IF YOU BECOME VERY SHORT OF BREATH OR HAVE CHEST PAINS.

## 2020-08-14 NOTE — PROGRESS NOTES
2020     Santos Newberry (:  1935) is a 81 y.o. male, here for evaluation of the following medical concerns:      Presents for six-month follow-up on current diagnoses:    CAD:  Hypertension-Coreg Lasix amlodipine   diastolic dysfunction   Chronic leg swelling: Report swelling in bilateral feet for 1 month. Is supposed be taking Lasix 20 mg twice daily  Does not eat low salt or elevate feet. Edema is not getting any better or worse. Denies pain. Dyslipidemia  Varicose veins bilateral lower extremity-pain management    COPD with dyspnea on exertion, baseline  COUGH with sputum production, baseline   Former smoker      BPH: Flomax  Uses Turkmen's primary language  Due to his primary language being Slovak, an interpretor was used for this visit using the [address]. Review of Systems   Constitutional: Negative for activity change, appetite change, chills, diaphoresis, fatigue, fever and unexpected weight change. Eyes: Negative for visual disturbance. Respiratory: Positive for cough and shortness of breath. Baseline   Cardiovascular: Positive for leg swelling. Negative for chest pain and palpitations. Genitourinary: Negative for decreased urine volume and difficulty urinating. Musculoskeletal: Negative for gait problem. Skin: Negative for color change and wound. Neurological: Negative for dizziness, light-headedness and headaches. Prior to Visit Medications    Medication Sig Taking?  Authorizing Provider   Compression Stockings MISC by Does not apply route Yes ERIS Luna NP   furosemide (LASIX) 20 MG tablet Take 1 tablet by mouth daily Take 20mg three times daily for one week and then resume 20mg BID Yes ERIS Luna NP   simvastatin (ZOCOR) 40 MG tablet Take 1 tablet by mouth nightly Yes Eloy Monson MD   amLODIPine (NORVASC) 5 MG tablet TAKE 2 TABLETS BY MOUTH ONE TIME A DAY Yes Eloy Monson MD   finasteride Pack years: 162.50     Last attempt to quit: 8/22/2009     Years since quitting: 10.9    Smokeless tobacco: Never Used    Tobacco comment: Pt smoked for 65 yrs. Quit 9 yrs ago. Updated 2/28/18   Substance Use Topics    Alcohol use: No     Alcohol/week: 4.2 standard drinks     Types: 5 Standard drinks or equivalent per week        Vitals:    08/14/20 0920   BP: (!) 148/80   Site: Right Upper Arm   Position: Sitting   Cuff Size: Large Adult   Pulse: 77   SpO2: 98%   Weight: 248 lb (112.5 kg)   Height: 6' (1.829 m)     Estimated body mass index is 33.63 kg/m² as calculated from the following:    Height as of this encounter: 6' (1.829 m). Weight as of this encounter: 248 lb (112.5 kg). Physical Exam  Vitals signs reviewed. Constitutional:       General: He is not in acute distress. Appearance: Normal appearance. He is obese. He is not ill-appearing, toxic-appearing or diaphoretic. HENT:      Head: Normocephalic and atraumatic. Nose: Nose normal.   Eyes:      Extraocular Movements: Extraocular movements intact. Pupils: Pupils are equal, round, and reactive to light. Neck:      Musculoskeletal: Normal range of motion and neck supple. Cardiovascular:      Rate and Rhythm: Normal rate and regular rhythm. Heart sounds: Normal heart sounds. Pulmonary:      Effort: Pulmonary effort is normal. No respiratory distress. Breath sounds: Normal breath sounds. No wheezing or rhonchi. Abdominal:      General: Bowel sounds are normal. There is no distension. Palpations: Abdomen is soft. There is no mass. Tenderness: There is no abdominal tenderness. Hernia: No hernia is present. Musculoskeletal: Normal range of motion. Right lower leg: Edema present. Left lower leg: Edema (2+ pitting) present. Skin:     General: Skin is warm and dry. Neurological:      General: No focal deficit present.       Mental Status: He is alert and oriented to person, place, and

## 2020-08-18 ENCOUNTER — TELEPHONE (OUTPATIENT)
Dept: FAMILY MEDICINE CLINIC | Age: 85
End: 2020-08-18

## 2020-08-18 RX ORDER — FUROSEMIDE 20 MG/1
TABLET ORAL
Qty: 90 TABLET | Refills: 0 | Status: SHIPPED | OUTPATIENT
Start: 2020-08-18 | End: 2020-09-16 | Stop reason: SDUPTHER

## 2020-09-16 ENCOUNTER — OFFICE VISIT (OUTPATIENT)
Dept: FAMILY MEDICINE CLINIC | Age: 85
End: 2020-09-16
Payer: MEDICARE

## 2020-09-16 VITALS
DIASTOLIC BLOOD PRESSURE: 100 MMHG | BODY MASS INDEX: 33.5 KG/M2 | HEART RATE: 77 BPM | SYSTOLIC BLOOD PRESSURE: 210 MMHG | WEIGHT: 247 LBS | OXYGEN SATURATION: 95 %

## 2020-09-16 PROCEDURE — 99214 OFFICE O/P EST MOD 30 MIN: CPT | Performed by: FAMILY MEDICINE

## 2020-09-16 RX ORDER — FUROSEMIDE 20 MG/1
20 TABLET ORAL 2 TIMES DAILY
Qty: 60 TABLET | Refills: 3 | Status: SHIPPED | OUTPATIENT
Start: 2020-09-16 | End: 2020-09-18

## 2020-09-16 RX ORDER — AMLODIPINE BESYLATE 10 MG/1
10 TABLET ORAL DAILY
Qty: 30 TABLET | Refills: 3 | Status: SHIPPED | OUTPATIENT
Start: 2020-09-16 | End: 2021-04-06 | Stop reason: SDUPTHER

## 2020-09-16 RX ORDER — CARVEDILOL 6.25 MG/1
3.12 TABLET ORAL 2 TIMES DAILY
Qty: 60 TABLET | Refills: 3 | Status: SHIPPED | OUTPATIENT
Start: 2020-09-16 | End: 2021-08-09 | Stop reason: SDUPTHER

## 2020-09-16 RX ORDER — AMLODIPINE BESYLATE 10 MG/1
10 TABLET ORAL DAILY
Qty: 30 TABLET | Refills: 3 | Status: SHIPPED | OUTPATIENT
Start: 2020-09-16 | End: 2020-09-16

## 2020-09-16 NOTE — PROGRESS NOTES
Sudhir Amen  1935 09/21/20    Chief Complaint   Patient presents with    Other     c/o headache x 3 wks           81 yrs old man with PMH of HTN, HLD, COPD, and GERD came today c/o:    Headache    This is a new problem. The current episode started 1 to 4 weeks ago. The problem occurs daily. The problem has been gradually worsening. The pain is located in the bilateral region. The quality of the pain is described as aching and throbbing. The pain is at a severity of 8/10. Associated symptoms include scalp tenderness. Pertinent negatives include no abdominal pain, abnormal behavior, anorexia, blurred vision, coughing, dizziness, facial sweating, fever, hearing loss, nausea, numbness, seizures, swollen glands, vomiting, weakness or weight loss. Nothing aggravates the symptoms. He has tried acetaminophen for the symptoms. The treatment provided no relief. His past medical history is significant for hypertension. Past Medical History:   Diagnosis Date    Abnormal PFTs (pulmonary function tests)     PFT 10/09 3.21 (98%) / 4.97 (115%)=0.65    BPH     Mod BPH 40 gm prostate- Cystourethroscopy 4/2010- Dr. Joce Marcos    COPD, [facility]) 2/28/2018    Coronary artery disease involving native coronary artery of native heart with unstable angina pectoris ([facility] [address]     Depression     Pt has refused Rx in the past    DJD (degenerative joint disease), lumbar     CT scan 3/29/2010    Dyspnea on exertion 10/4/2018    Former smoker 2/28/2018    H/O echocardiogram 05/11/2018    EF 50-60%, Mild LVH, Grade 1 diastolic dysfunction, Sclerotice but non-stenotic AV.  H/O echocardiogram 09/10/2018    Limited- EF 50-55%, AV sclerotic but not stenotic, visualized MR, TR, and NV. Dilated aortic root.  History of nuclear stress test 08/07/2018    cardiolite-normal    Hx of cardiovascular stress test 05/11/2018    EF 52%, Abn stress images, Mild small-size lateral wall perfusion, Mod inferior wall ischemia.     Hypertension 1996    Insomnia     Kidney stones     CT scan 3/29/2010    Liver cyst     CT scan 3/29/2010    Lumbar disc disease 2012    Dr. Davi Godinez, will refer to Dr. Kimber Pedroza for eval    Lumbar stenosis     CT scan 3/29/2010    Nocturia     Orchitis and epididymitis, unspecified 2012    Dr. Enrique Saldana    Pain in left testicle     Pancreatic cyst     CT scan 3/29/2010    Spinal stenosis 2012    severe-Dr. Kirill Metcalf [facility]icide attempt [facility]) 3/2005    drug overdose with  Zestoretic    Tachycardia     \"saw Dr , not sure of his name, did tests and did not find anything\"\"that was about 4 months ago\"\"no more episodes since had work up\"    Ulcer     hx of ulcers\"three years ago but ok now\"(pc)     Past Surgical History:   Procedure Laterality Date    CHOLECYSTECTOMY  42 yrs ago    CYSTOSCOPY  2010    ENDOSCOPY, COLON, DIAGNOSTIC      EYE SURGERY      cataract ext carroll    HERNIA REPAIR  over 12 yrs ago    Bilateral inguinal hernia repair    PTCA      [facility]    PULMONARY STRESS TEST  3/1/12    Normal; ejection fraction 70%; exercise capacity 4.6 METS    TONSILLECTOMY       History reviewed. No pertinent family history. Social History     Socioeconomic History    Marital status: Legally      Spouse name: Not on file    Number of children: Not on file    Years of education: Not on file    Highest education level: Not on file   Occupational History    Not on file   Social Needs    Financial resource strain: Not on file    Food insecurity     Worry: Not on file     Inability: Not on file    Transportation needs     Medical: Not on file     Non-medical: Not on file   Tobacco Use    Smoking status: Former Smoker     Packs/day: 2.50     Years: 65.00     Pack years: 162.50     Last attempt to quit: 2009     Years since quittin.0    Smokeless tobacco: Never Used    Tobacco comment: Pt smoked for 65 yrs. Quit 9 yrs ago. Updated 18   Substance and Sexual Activity    Alcohol use:  No Alcohol/week: 4.2 standard drinks     Types: 5 Standard drinks or equivalent per week    Drug use: Not on file    Sexual activity: Not on file   Lifestyle    Physical activity     Days per week: Not on file     Minutes per session: Not on file    Stress: Not on file   Relationships    Social connections     Talks on phone: Not on file     Gets together: Not on file     Attends Yazdanism service: Not on file     Active member of club or organization: Not on file     Attends meetings of clubs or organizations: Not on file     Relationship status: Not on file    Intimate partner violence     Fear of current or ex partner: Not on file     Emotionally abused: Not on file     Physically abused: Not on file     Forced sexual activity: Not on file   Other Topics Concern    Not on file   Social History Narrative    Not on file       Allergies   Allergen Reactions    Ace Inhibitors      Angioedema, 8/05/ (on 9/26/12- pt denies any allergic reaction to any medications)     Current Outpatient Medications   Medication Sig Dispense Refill    carvedilol (COREG) 6.25 MG tablet Take 0.5 tablets by mouth 2 times daily 60 tablet 3    amLODIPine (NORVASC) 10 MG tablet Take 1 tablet by mouth daily 30 tablet 3    Compression Stockings MISC by Does not apply route 1 each 0    simvastatin (ZOCOR) 40 MG tablet Take 1 tablet by mouth nightly 90 tablet 3    finasteride (PROSCAR) 5 MG tablet Take 1 tablet by mouth daily Please print sig in Moldovan if available (Moldovan speaking only patient) 90 tablet 3    omeprazole (PRILOSEC) 40 MG delayed release capsule Take 1 capsule by mouth every morning Please print sig in Moldovan if available (Moldovan speaking only patient) 90 capsule 3    DULoxetine (CYMBALTA) 20 MG extended release capsule TAKE 1 CAPSULE BY MOUTH ONE TIME A DAY  30 capsule 11    clopidogrel (PLAVIX) 75 MG tablet TAKE 1 TABLET BY MOUTH ONE TIME A DAY  30 tablet 11    tamsulosin (FLOMAX) 0.4 MG capsule Take 2 capsules by mouth nightly Please print sig in Fijian if available (Fijian speaking only patient) 60 capsule 11    furosemide (LASIX) 20 MG tablet Take 1 tablet by mouth 2 times daily Please print sig in Fijian if available (Fijian speaking only patient) 60 tablet 11    guaiFENesin (MUCINEX) 600 MG extended release tablet Take 1 tablet by mouth 2 times daily To thin mucus (please give instructions in Korean) 60 tablet 1    Tiotropium Bromide-Olodaterol 2.5-2.5 MCG/ACT AERS Inhale 2 puffs into the lungs daily Please print sig in Fijian if available (Fijian speaking only patient)- Failed Anoro (Patient not taking: Reported on 9/18/2020) 4 g 5    umeclidinium-vilanterol (ANORO ELLIPTA) 62.5-25 MCG/INH AEPB inhaler Inhale 1 puff into the lungs daily 1 each 11    nitroGLYCERIN (NITROSTAT) 0.4 MG SL tablet Place 1 tablet under the tongue every 5 minutes as needed for Chest pain 25 tablet 3    aspirin 81 MG chewable tablet Take 1 tablet by mouth daily 30 tablet 3     No current facility-administered medications for this visit. Review of Systems   Constitutional: Negative for activity change, chills, fatigue, fever and weight loss. HENT: Negative for congestion and hearing loss. Eyes: Negative for blurred vision. Respiratory: Negative for cough and shortness of breath. Cardiovascular: Positive for leg swelling. Negative for chest pain. Gastrointestinal: Negative for abdominal pain, anorexia, nausea and vomiting. Skin: Negative for rash. Neurological: Positive for headaches. Negative for dizziness, seizures, weakness and numbness. Psychiatric/Behavioral: Negative for agitation and sleep disturbance. The patient is not nervous/anxious.         Lab Results   Component Value Date    WBC 8.7 07/16/2018    HGB 15.1 07/16/2018    HCT 45.0 07/16/2018    MCV 96.4 07/16/2018     07/16/2018     Lab Results   Component Value Date     08/14/2020    K 4.8 08/14/2020     08/14/2020 Mental Status: He is alert and oriented to person, place, and time. Psychiatric:         Mood and Affect: Mood normal.         Behavior: Behavior normal.         ASSESSMENT/ PLAN:    1. Acute nonintractable headache, unspecified headache type  Ct head ordered    2. Essential hypertension  - stable  - carvedilol (COREG) 6.25 MG tablet; Take 0.5 tablets by mouth 2 times daily  Dispense: 60 tablet; Refill: 3  - amLODIPine (NORVASC) 10 MG tablet; Take 1 tablet by mouth daily  Dispense: 30 tablet; Refill: 3              - All old blood work reviewed with the patient  - Appropriate prescription are addressed. - After visit summery provided. - Questions answered and patient verbalizes understanding.  - Call for any problem, questions, or concerns. Return if symptoms worsen or fail to improve.

## 2020-09-18 ENCOUNTER — HOSPITAL ENCOUNTER (OUTPATIENT)
Dept: CT IMAGING | Age: 85
Discharge: HOME | End: 2020-09-18
Payer: MEDICARE

## 2020-09-18 ENCOUNTER — OFFICE VISIT (OUTPATIENT)
Dept: FAMILY MEDICINE CLINIC | Age: 85
End: 2020-09-18
Payer: MEDICARE

## 2020-09-18 VITALS
WEIGHT: 246 LBS | HEIGHT: 72 IN | DIASTOLIC BLOOD PRESSURE: 80 MMHG | BODY MASS INDEX: 33.32 KG/M2 | OXYGEN SATURATION: 97 % | SYSTOLIC BLOOD PRESSURE: 138 MMHG | HEART RATE: 87 BPM

## 2020-09-18 PROCEDURE — 90694 VACC AIIV4 NO PRSRV 0.5ML IM: CPT | Performed by: FAMILY MEDICINE

## 2020-09-18 PROCEDURE — G0008 ADMIN INFLUENZA VIRUS VAC: HCPCS | Performed by: FAMILY MEDICINE

## 2020-09-18 PROCEDURE — 99214 OFFICE O/P EST MOD 30 MIN: CPT | Performed by: FAMILY MEDICINE

## 2020-09-18 PROCEDURE — 70450 CT HEAD/BRAIN W/O DYE: CPT

## 2020-09-18 ASSESSMENT — PATIENT HEALTH QUESTIONNAIRE - PHQ9
1. LITTLE INTEREST OR PLEASURE IN DOING THINGS: 1
SUM OF ALL RESPONSES TO PHQ QUESTIONS 1-9: 2
SUM OF ALL RESPONSES TO PHQ9 QUESTIONS 1 & 2: 2
SUM OF ALL RESPONSES TO PHQ QUESTIONS 1-9: 2
2. FEELING DOWN, DEPRESSED OR HOPELESS: 1

## 2020-09-18 NOTE — PROGRESS NOTES
Vaccine Information Sheet, \"Influenza - Inactivated\"  given to Belle Griffin, or parent/legal guardian of  Belle Griffin and verbalized understanding. Patient responses:    Have you ever had a reaction to a flu vaccine? No  Do you have any current illness? No  Have you ever had Guillian Toyah Syndrome? No  Do you have a serious allergy to any of the follow: Neomycin, Polymyxin, Thimerosal, eggs or egg products? No    Flu vaccine given per order. Please see immunization tab. Risks and benefits explained. Current VIS given.       Immunizations Administered     Name Date Dose Route    Influenza, Quadv, adjuvanted, 65 yrs +, IM, PF (Fluad) 9/18/2020 0.5 mL Intramuscular    Site: Deltoid- Right    Lot: [id number]    NDC: 41086-206-24

## 2020-09-18 NOTE — PROGRESS NOTES
John Camargo  1935 09/22/20    Chief Complaint   Patient presents with    Hypertension           Patient here for a 2 days follow-up regarding his a blood pressure, was seen the day before yesterday and has a blood pressure was 210/100, patient does not speak [place] and  was by the iPad, we told him to take the amlodipine the Lasix and carvedilol, patient is just not understanding what we told him, and he is been taking just the amlodipine and the carvedilol, he is not taking the Lasix, his blood pressure today is much better, patient stats still has the same headache on his occipital area, the leg swelling is still the same. Denies chest pain, has same shortness of breath, no dizziness. Past Medical History:   Diagnosis Date    Abnormal PFTs (pulmonary function tests)     PFT 10/09 3.21 (98%) / 4.97 (115%)=0.65    BPH     Mod BPH 40 gm prostate- Cystourethroscopy 4/2010- Dr. Yessenia Carey    COPD, [facility]) 2/28/2018    Coronary artery disease involving native coronary artery of native heart with unstable angina pectoris ([facility] [address]     Depression     Pt has refused Rx in the past    DJD (degenerative joint disease), lumbar     CT scan 3/29/2010    Dyspnea on exertion 10/4/2018    Former smoker 2/28/2018    H/O echocardiogram 05/11/2018    EF 50-60%, Mild LVH, Grade 1 diastolic dysfunction, Sclerotice but non-stenotic AV.  H/O echocardiogram 09/10/2018    Limited- EF 50-55%, AV sclerotic but not stenotic, visualized MR, TR, and NE. Dilated aortic root.  History of nuclear stress test 08/07/2018    cardiolite-normal    Hx of cardiovascular stress test 05/11/2018    EF 52%, Abn stress images, Mild small-size lateral wall perfusion, Mod inferior wall ischemia.     Hypertension 1996    Insomnia     Kidney stones     CT scan 3/29/2010    Liver cyst     CT scan 3/29/2010    Lumbar disc disease 8/2012    Dr. Anupama Porras, will refer to Dr. Woodrow Wong for eval    Lumbar stenosis     CT scan 3/29/2010    Nocturia     Orchitis and epididymitis, unspecified 2012    Dr. Mazin Schroeder    Pain in left testicle     Pancreatic cyst     CT scan 3/29/2010    Spinal stenosis 2012    severe-Dr. Kraig Amaya Suicide attempt [facility]) 3/2005    drug overdose with  Zestoretic    Tachycardia     \"saw  , not sure of his name, did tests and did not find anything\"\"that was about 4 months ago\"\"no more episodes since had work up\"    Ulcer     hx of ulcers\"three years ago but ok now\"(pc)     Past Surgical History:   Procedure Laterality Date    CHOLECYSTECTOMY  42 yrs ago    CYSTOSCOPY  2010    ENDOSCOPY, COLON, DIAGNOSTIC      EYE SURGERY      cataract ext carroll    HERNIA REPAIR  over 12 yrs ago    Bilateral inguinal hernia repair    [facility]  [id number]    [facility]    PULMONARY STRESS TEST  3/1/12    Normal; ejection fraction 70%; exercise capacity 4.6 METS    TONSILLECTOMY       No family history on file. Social History     Socioeconomic History    Marital status: Legally      Spouse name: Not on file    Number of children: Not on file    Years of education: Not on file    Highest education level: Not on file   Occupational History    Not on file   Social Needs    Financial resource strain: Not on file    Food insecurity     Worry: Not on file     Inability: Not on file    Transportation needs     Medical: Not on file     Non-medical: Not on file   Tobacco Use    Smoking status: Former Smoker     Packs/day: 2.50     Years: 65.00     Pack years: 162.50     Last attempt to quit: 2009     Years since quittin.0    Smokeless tobacco: Never Used    Tobacco comment: Pt smoked for 65 yrs. Quit 9 yrs ago.  Updated 18   Substance and Sexual Activity    Alcohol use: No     Alcohol/week: 4.2 standard drinks     Types: 5 Standard drinks or equivalent per week    Drug use: Not on file    Sexual activity: Not on file   Lifestyle    Physical activity     Days per week: Not on file     Minutes per session: Not on file    Stress: Not on file   Relationships    Social connections     Talks on phone: Not on file     Gets together: Not on file     Attends Congregational service: Not on file     Active member of club or organization: Not on file     Attends meetings of clubs or organizations: Not on file     Relationship status: Not on file    Intimate partner violence     Fear of current or ex partner: Not on file     Emotionally abused: Not on file     Physically abused: Not on file     Forced sexual activity: Not on file   Other Topics Concern    Not on file   Social History Narrative    Not on file       Allergies   Allergen Reactions    Ace Inhibitors      Angioedema, 8/05/ (on 9/26/12- pt denies any allergic reaction to any medications)     Current Outpatient Medications   Medication Sig Dispense Refill    carvedilol (COREG) 6.25 MG tablet Take 0.5 tablets by mouth 2 times daily 60 tablet 3    amLODIPine (NORVASC) 10 MG tablet Take 1 tablet by mouth daily 30 tablet 3    finasteride (PROSCAR) 5 MG tablet Take 1 tablet by mouth daily Please print sig in Iraqi if available (Iraqi speaking only patient) 90 tablet 3    omeprazole (PRILOSEC) 40 MG delayed release capsule Take 1 capsule by mouth every morning Please print sig in Iraqi if available (Iraqi speaking only patient) 90 capsule 3    DULoxetine (CYMBALTA) 20 MG extended release capsule TAKE 1 CAPSULE BY MOUTH ONE TIME A DAY  30 capsule 11    tamsulosin (FLOMAX) 0.4 MG capsule Take 2 capsules by mouth nightly Please print sig in Iraqi if available (Iraqi speaking only patient) 60 capsule 11    furosemide (LASIX) 20 MG tablet Take 1 tablet by mouth 2 times daily Please print sig in Iraqi if available (Iraqi speaking only patient) 60 tablet 11    guaiFENesin (MUCINEX) 600 MG extended release tablet Take 1 tablet by mouth 2 times daily To thin mucus (please give instructions in Panamanian) 60 tablet 1    nitroGLYCERIN (NITROSTAT) 0.4 MG SL tablet Place 1 tablet under the tongue every 5 minutes as needed for Chest pain 25 tablet 3    Compression Stockings MISC by Does not apply route 1 each 0    simvastatin (ZOCOR) 40 MG tablet Take 1 tablet by mouth nightly 90 tablet 3    clopidogrel (PLAVIX) 75 MG tablet TAKE 1 TABLET BY MOUTH ONE TIME A DAY  30 tablet 11    Tiotropium Bromide-Olodaterol 2.5-2.5 MCG/ACT AERS Inhale 2 puffs into the lungs daily Please print sig in Lithuanian if available (Lithuanian speaking only patient)- Failed Anoro (Patient not taking: Reported on 9/18/2020) 4 g 5    umeclidinium-vilanterol (ANORO ELLIPTA) 62.5-25 MCG/INH AEPB inhaler Inhale 1 puff into the lungs daily 1 each 11    aspirin 81 MG chewable tablet Take 1 tablet by mouth daily 30 tablet 3     No current facility-administered medications for this visit. Review of Systems   Constitutional: Negative for activity change, chills, fatigue and fever. HENT: Negative for congestion and hearing loss. Respiratory: Negative for cough and shortness of breath. Cardiovascular: Positive for leg swelling. Negative for chest pain. Gastrointestinal: Negative for abdominal pain, nausea and vomiting. Musculoskeletal: Negative for back pain. Neurological: Positive for headaches. Negative for dizziness, seizures, weakness and numbness. Psychiatric/Behavioral: Negative for agitation and sleep disturbance. The patient is not nervous/anxious.         Lab Results   Component Value Date    WBC 8.7 07/16/2018    HGB 15.1 07/16/2018    HCT 45.0 07/16/2018    MCV 96.4 07/16/2018     07/16/2018     Lab Results   Component Value Date     08/14/2020    K 4.8 08/14/2020     08/14/2020    CO2 27 08/14/2020    BUN 21 (H) 08/14/2020    CREATININE 1.2 08/14/2020    GLUCOSE 104 (H) 08/14/2020    CALCIUM 9.5 08/14/2020    PROT 6.5 07/16/2018    LABALBU 4.1 07/16/2018    BILITOT 0.4 07/16/2018    ALKPHOS 105 07/16/2018     (H) 07/16/2018     (H) 07/16/2018    LABGLOM 58 (A) 08/14/2020    GFRAA >60 08/14/2020    AGRATIO 1.6 12/22/2017    GLOB 2.7 12/22/2017     Lab Results   Component Value Date    CHOL 161 02/08/2019    CHOL 212 (H) 07/16/2018    CHOL 210 (H) 03/02/2012     Lab Results   Component Value Date    TRIG 78 02/08/2019    TRIG 83 07/16/2018    TRIG 143 03/02/2012     Lab Results   Component Value Date    HDL 62 02/08/2019    HDL 64 07/16/2018    HDL 50 (L) 03/02/2012     Lab Results   Component Value Date    LDLCALC 131 (H) 03/02/2012     No results found for: LABA1C  Lab Results   Component Value Date    TSH 5.27 (H) 04/03/2018         /80 (Site: Left Upper Arm, Position: Sitting, Cuff Size: Large Adult)   Pulse 87   Ht 6' (1.829 m)   Wt 246 lb (111.6 kg)   SpO2 97%   BMI 33.36 kg/m²     BP Readings from Last 3 Encounters:   09/18/20 138/80   09/16/20 (!) 210/100   08/14/20 (!) 148/80       Wt Readings from Last 3 Encounters:   09/18/20 246 lb (111.6 kg)   09/16/20 247 lb (112 kg)   08/14/20 248 lb (112.5 kg)         Physical Exam  Constitutional:       General: He is not in acute distress. Appearance: Normal appearance. He is well-developed. He is not ill-appearing or diaphoretic. HENT:      Head: Normocephalic and atraumatic. Eyes:      General: No scleral icterus. Extraocular Movements: Extraocular movements intact. Pupils: Pupils are equal, round, and reactive to light. Neck:      Musculoskeletal: Normal range of motion and neck supple. No neck rigidity. Cardiovascular:      Rate and Rhythm: Normal rate and regular rhythm. Heart sounds: Normal heart sounds. No murmur. Pulmonary:      Effort: Pulmonary effort is normal.      Breath sounds: Normal breath sounds. No wheezing. Musculoskeletal: Normal range of motion. Right lower leg: Edema (mild) present. Left lower leg: Edema (mild) present. Neurological:      General: No focal deficit present.       Mental Status: He is alert and oriented to person, place, and time. Cranial Nerves: No cranial nerve deficit. Psychiatric:         Mood and Affect: Mood normal.         Behavior: Behavior normal.         ASSESSMENT/ PLAN:    1. Essential hypertension  -Getting better today, patient needs to take the Lasix    2. Headache disorder  -We will do the CT of the chest to rule out any other problem  - CT HEAD WO CONTRAST; Future    3. Leg swelling  -Need to take the Lasix 2 tablets daily    4. Need for influenza vaccination  -Tolerated shot  - INFLUENZA, QUADV, ADJUVANTED, 65 YRS =, IM, PF, PREFILL SYR, 0.5ML (FLUAD)    Patient difficult to understand even with the , so we repeated many times what medication for the blood pressure he should be on. Patient does live by himself and his kids away from him. No one can help him for his blood pressure or other medication he is on. We spent half an hour just with him and the  keep repeating what medication he should be on          - All old blood work reviewed with the patient  - Appropriate prescription are addressed. - After visit babar provided. - Questions answered and patient verbalizes understanding.  - Call for any problem, questions, or concerns. Return in about 1 week (around 9/25/2020).

## 2020-09-20 PROBLEM — R51.9 ACUTE NONINTRACTABLE HEADACHE: Status: ACTIVE | Noted: 2020-09-20

## 2020-09-20 ASSESSMENT — ENCOUNTER SYMPTOMS
NAUSEA: 0
BLURRED VISION: 0
ABDOMINAL PAIN: 0
SCALP TENDERNESS: 1
SHORTNESS OF BREATH: 0
FACIAL SWEATING: 0
COUGH: 0
VOMITING: 0
SWOLLEN GLANDS: 0

## 2020-09-22 ASSESSMENT — ENCOUNTER SYMPTOMS
SHORTNESS OF BREATH: 0
ABDOMINAL PAIN: 0
NAUSEA: 0
COUGH: 0
VOMITING: 0
BACK PAIN: 0

## 2020-09-24 ENCOUNTER — OFFICE VISIT (OUTPATIENT)
Dept: FAMILY MEDICINE CLINIC | Age: 85
End: 2020-09-24
Payer: MEDICARE

## 2020-09-24 VITALS
SYSTOLIC BLOOD PRESSURE: 150 MMHG | OXYGEN SATURATION: 96 % | WEIGHT: 249.2 LBS | BODY MASS INDEX: 33.8 KG/M2 | HEART RATE: 94 BPM | DIASTOLIC BLOOD PRESSURE: 80 MMHG

## 2020-09-24 PROCEDURE — 99214 OFFICE O/P EST MOD 30 MIN: CPT | Performed by: FAMILY MEDICINE

## 2020-09-24 ASSESSMENT — ENCOUNTER SYMPTOMS
ABDOMINAL PAIN: 0
COUGH: 0
BACK PAIN: 0

## 2020-09-24 NOTE — PROGRESS NOTES
Cinthya Gonzalez  1935 09/27/20    Chief Complaint   Patient presents with    Other     1 week follow up regarding hypertension and headache           Patient here for follow-up 1 week regarding his hypertension and headache, patient is still has the same headache on his back the head, patient does not speak English, and not follow the instructions, he missing 1 dose of his blood pressure medication, patient is a stuper, patient today complaining of a chest pain, and refus to go to see the cardiologist, also states he will discontinue his medication, because does not help him, not give him any relief, taking them for no benefit. Does live by himself. His Son a physician in [place], does not has a phone to contact his son. Chest Pain    This is a new problem. The current episode started in the past 7 days. The problem occurs intermittently. The problem has been unchanged. The pain is present in the lateral region. The pain is at a severity of 8/10. The quality of the pain is described as dull and pressure. The pain does not radiate. Associated symptoms include diaphoresis, headaches, lower extremity edema, numbness, orthopnea and shortness of breath. Pertinent negatives include no abdominal pain, back pain, cough, hemoptysis, irregular heartbeat, nausea, palpitations, vomiting or weakness. The pain is aggravated by nothing. He has tried nothing for the symptoms. Risk factors include stress and being elderly. His past medical history is significant for CAD and hypertension. Prior diagnostic workup includes echocardiogram, Persantine thallium and stress echo.        Past Medical History:   Diagnosis Date    Abnormal PFTs (pulmonary function tests)     PFT 10/09 3.21 (98%) / 4.97 (115%)=0.65    BPH     Mod BPH 40 gm prostate- Cystourethroscopy 4/2010- Dr. Hortensia Richardson    COPD, [facility]) 2/28/2018    Coronary artery disease involving native coronary artery of native heart with unstable angina pectoris ([facility] [address]  Depression     Pt has refused Rx in the past    DJD (degenerative joint disease), lumbar     CT scan 3/29/2010    Dyspnea on exertion 10/4/2018    Former smoker 2/28/2018    H/O echocardiogram 05/11/2018    EF 50-60%, Mild LVH, Grade 1 diastolic dysfunction, Sclerotice but non-stenotic AV.  H/O echocardiogram 09/10/2018    Limited- EF 50-55%, AV sclerotic but not stenotic, visualized MR, TR, and CT. Dilated aortic root.  History of nuclear stress test 08/07/2018    cardiolite-normal    Hx of cardiovascular stress test 05/11/2018    EF 52%, Abn stress images, Mild small-size lateral wall perfusion, Mod inferior wall ischemia.  Hypertension 1996    Insomnia     Kidney stones     CT scan 3/29/2010    Liver cyst     CT scan 3/29/2010    Lumbar disc disease 8/2012    Dr. Yonis Alvarez, will refer to Dr. Rosana Padilla for eval    Lumbar stenosis     CT scan 3/29/2010    Nocturia     Orchitis and epididymitis, unspecified 8/2012    Dr. Casper Sides    Pain in left testicle     Pancreatic cyst     CT scan 3/29/2010    Spinal stenosis 8/2012    severe-Dr. Mat Leavitt Suicide attempt [facility]) 3/2005    drug overdose with  Zestoretic    Tachycardia     \"saw Dr , not sure of his name, did tests and did not find anything\"\"that was about 4 months ago\"\"no more episodes since had work up\"    Ulcer     hx of ulcers\"three years ago but ok now\"(pc)     Past Surgical History:   Procedure Laterality Date    CHOLECYSTECTOMY  42 yrs ago    CYSTOSCOPY  4/2010    ENDOSCOPY, COLON, DIAGNOSTIC      EYE SURGERY      cataract ext carroll    HERNIA REPAIR  over 12 yrs ago    Bilateral inguinal hernia repair    [facility]  717/2018    [facility]    PULMONARY STRESS TEST  3/1/12    Normal; ejection fraction 70%; exercise capacity 4.6 METS    TONSILLECTOMY       History reviewed. No pertinent family history.   Social History     Socioeconomic History    Marital status: Legally      Spouse name: Not on file    Number of children: Not on file    Years of education: Not on file    Highest education level: Not on file   Occupational History    Not on file   Social Needs    Financial resource strain: Not on file    Food insecurity     Worry: Not on file     Inability: Not on file    Transportation needs     Medical: Not on file     Non-medical: Not on file   Tobacco Use    Smoking status: Former Smoker     Packs/day: 2.50     Years: 65.00     Pack years: 162.50     Last attempt to quit: 2009     Years since quittin.1    Smokeless tobacco: Never Used    Tobacco comment: Pt smoked for 65 yrs. Quit 9 yrs ago.  Updated 18   Substance and Sexual Activity    Alcohol use: No     Alcohol/week: 4.2 standard drinks     Types: 5 Standard drinks or equivalent per week    Drug use: Not on file    Sexual activity: Not on file   Lifestyle    Physical activity     Days per week: Not on file     Minutes per session: Not on file    Stress: Not on file   Relationships    Social connections     Talks on phone: Not on file     Gets together: Not on file     Attends Evangelical service: Not on file     Active member of club or organization: Not on file     Attends meetings of clubs or organizations: Not on file     Relationship status: Not on file    Intimate partner violence     Fear of current or ex partner: Not on file     Emotionally abused: Not on file     Physically abused: Not on file     Forced sexual activity: Not on file   Other Topics Concern    Not on file   Social History Narrative    Not on file       Allergies   Allergen Reactions    Ace Inhibitors      Angioedema, / (on 12- pt denies any allergic reaction to any medications)     Current Outpatient Medications   Medication Sig Dispense Refill    carvedilol (COREG) 6.25 MG tablet Take 0.5 tablets by mouth 2 times daily 60 tablet 3    amLODIPine (NORVASC) 10 MG tablet Take 1 tablet by mouth daily 30 tablet 3    Compression Stockings MISC by Does not apply route 1 Positive for chest pain, orthopnea and leg swelling. Negative for palpitations. Gastrointestinal: Negative for abdominal pain, constipation, diarrhea, nausea and vomiting. Genitourinary: Negative for dysuria. Musculoskeletal: Negative for back pain and gait problem. Neurological: Positive for numbness and headaches. Negative for weakness. Psychiatric/Behavioral: Positive for agitation and sleep disturbance. The patient is nervous/anxious.         Lab Results   Component Value Date    WBC 8.7 07/16/2018    HGB 15.1 07/16/2018    HCT 45.0 07/16/2018    MCV 96.4 07/16/2018     07/16/2018     Lab Results   Component Value Date     08/14/2020    K 4.8 08/14/2020     08/14/2020    CO2 27 08/14/2020    BUN 21 (H) 08/14/2020    CREATININE 1.2 08/14/2020    GLUCOSE 104 (H) 08/14/2020    CALCIUM 9.5 08/14/2020    PROT 6.5 07/16/2018    LABALBU 4.1 07/16/2018    BILITOT 0.4 07/16/2018    ALKPHOS 105 07/16/2018     (H) 07/16/2018     (H) 07/16/2018    LABGLOM 58 (A) 08/14/2020    GFRAA >60 08/14/2020    AGRATIO 1.6 12/22/2017    GLOB 2.7 12/22/2017     Lab Results   Component Value Date    CHOL 161 02/08/2019    CHOL 212 (H) 07/16/2018    CHOL 210 (H) 03/02/2012     Lab Results   Component Value Date    TRIG 78 02/08/2019    TRIG 83 07/16/2018    TRIG 143 03/02/2012     Lab Results   Component Value Date    HDL 62 02/08/2019    HDL 64 07/16/2018    HDL 50 (L) 03/02/2012     Lab Results   Component Value Date    LDLCALC 131 (H) 03/02/2012     No results found for: LABA1C  Lab Results   Component Value Date    TSH 5.27 (H) 04/03/2018         BP (!) 150/80 (Site: Left Upper Arm, Position: Sitting, Cuff Size: Large Adult)   Pulse 94   Wt 249 lb 3.2 oz (113 kg)   SpO2 96%   BMI 33.80 kg/m²     BP Readings from Last 3 Encounters:   09/24/20 (!) 150/80   09/18/20 138/80   09/16/20 (!) 210/100       Wt Readings from Last 3 Encounters:   09/24/20 249 lb 3.2 oz (113 kg)   09/18/20 246 lb (111.6 kg)   09/16/20 247 lb (112 kg)         Physical Exam  Constitutional:       General: He is not in acute distress. Appearance: Normal appearance. He is not ill-appearing or diaphoretic. HENT:      Head: Normocephalic and atraumatic. Eyes:      General: No scleral icterus. Extraocular Movements: Extraocular movements intact. Pupils: Pupils are equal, round, and reactive to light. Neck:      Musculoskeletal: Normal range of motion and neck supple. Cardiovascular:      Rate and Rhythm: Normal rate and regular rhythm. Heart sounds: No murmur. Pulmonary:      Effort: Pulmonary effort is normal.      Breath sounds: Normal breath sounds. No wheezing or rhonchi. Musculoskeletal:      Right lower leg: Edema (1+) present. Left lower leg: Edema (1+) present. Neurological:      General: No focal deficit present. Mental Status: He is alert. ASSESSMENT/ PLAN:    1. Essential hypertension  -Blood pressure is better, still does not know the baxter dose of the medication  - CBC Auto Differential; Future  - Comprehensive Metabolic Panel, Fasting; Future    2. Intractable chronic cluster headache  -CT done was normal  - [facility] - Rafael Zelaya, [place], Neurology, [place]  -Used to see the neurologist to refuse to add any more medication. 3. Other chest pain  -Refused to go to see his cardiologist and refused to see another cardiologist      Spent more than 30 minutes with the patient because of difficult understand and we have the  via iPad     -Is a stupor and noncompliant  - All old blood work reviewed with the patient  - Appropriate prescription are addressed. - After visit summery provided. - Questions answered and patient verbalizes understanding.  - Call for any problem, questions, or concerns.  - RTC if symptoms worse. Return in about 1 month (around 10/24/2020).

## 2020-09-27 ASSESSMENT — ENCOUNTER SYMPTOMS
DIARRHEA: 0
HEMOPTYSIS: 0
ORTHOPNEA: 1
CONSTIPATION: 0
NAUSEA: 0
VOMITING: 0
WHEEZING: 0
SHORTNESS OF BREATH: 1

## 2020-10-01 ENCOUNTER — TELEPHONE (OUTPATIENT)
Dept: FAMILY MEDICINE CLINIC | Age: 85
End: 2020-10-01

## 2020-10-01 NOTE — TELEPHONE ENCOUNTER
Pt came into the office today after had a fall yesterday. Pt had bruising around his eyes and his right wrist was swollen and red. Pt demanded an appt and when he was told that could not see him that he would have to go to ED he refused. Pt was offered an appt at 8:15 am in the morning with CNP and refused that.  Pt stormed out of the office,

## 2020-10-06 ENCOUNTER — OFFICE VISIT (OUTPATIENT)
Dept: FAMILY MEDICINE CLINIC | Age: 85
End: 2020-10-06
Payer: MEDICARE

## 2020-10-06 VITALS
BODY MASS INDEX: 33.23 KG/M2 | DIASTOLIC BLOOD PRESSURE: 80 MMHG | OXYGEN SATURATION: 97 % | WEIGHT: 245 LBS | HEART RATE: 93 BPM | SYSTOLIC BLOOD PRESSURE: 180 MMHG

## 2020-10-06 PROCEDURE — 99214 OFFICE O/P EST MOD 30 MIN: CPT | Performed by: FAMILY MEDICINE

## 2020-10-06 RX ORDER — TIZANIDINE 2 MG/1
2 TABLET ORAL NIGHTLY PRN
Qty: 20 TABLET | Refills: 1 | Status: SHIPPED | OUTPATIENT
Start: 2020-10-06 | End: 2020-12-18

## 2020-10-06 RX ORDER — AMOXICILLIN AND CLAVULANATE POTASSIUM 875; 125 MG/1; MG/1
1 TABLET, FILM COATED ORAL 2 TIMES DAILY
Qty: 14 TABLET | Refills: 0 | Status: SHIPPED | OUTPATIENT
Start: 2020-10-06 | End: 2020-10-13

## 2020-10-06 RX ORDER — NABUMETONE 500 MG/1
500 TABLET, FILM COATED ORAL 2 TIMES DAILY PRN
Qty: 40 TABLET | Refills: 1 | Status: SHIPPED | OUTPATIENT
Start: 2020-10-06 | End: 2021-11-24

## 2020-10-06 NOTE — Clinical Note
Patient was seen about 2 weeks ago and had fallen and had several injuries which were improving slowly. He had declined to be taken to the ER and he declined for us to do an evaluation with x-rays. Toward the end of this visit he also stated he had no money and wanted to know if we could help him get connected with financial assistance. He appears to have [employer] Medicare so I am not sure what options are open to us but if you have a social work resource who can contact him that would be appreciated. Unfortunately they will need a Somali and patient has been inpatient with Somali interpreters when used in our office. He does not speak good enough English for you to communicate with him without an  although he will at times refuse an .

## 2020-10-06 NOTE — PROGRESS NOTES
Chief Complaint   Patient presents with    Fall     1 week follow up on fall. contusions under both eyes left shoulder and left knee, swelling in right thumb and palm     Headache     occipital region       [place] NARRATIVE:    Patient fell outside a store onto his face, stating he tripped on the ground. He hit his face and left shoulder and left hand and palm and it is all bruised. Apparently store personnel came to help him and several other people came to help him but he generally refused their assistance. He refused for EMS evaluation and to be transported. He still has bruising and a cut on his nose, a black eye and old headache. He does have some neck pain but I think that is also old. We had been trying to arrange head imaging study for him due to old history of closed head injury, he would require general anesthesia for that and it would have to be coordinated with anesthesia in the hospital and he has not responded to several out reach is to schedule this. We had a  on video to assistance but patient was somewhat agitated and impatient with interpretation and he also declined to have evaluation or x-ray of any of the injured areas. I am fearful that his hand might have a fracture but he still declined an x-ray stating that would cost money he did not have. Patient is established unless otherwise noted. Above chief complaint and [place] obtained by this physician provider. Review of Systems   Constitutional: Negative for activity change, chills, fatigue and fever. HENT: Negative for congestion. With facial bruising including a cut on his nose that looks good now and a hematoma around the left eye which is already improving   Respiratory: Negative for cough, chest tightness, shortness of breath and wheezing. Cardiovascular: Negative for chest pain and leg swelling. Gastrointestinal: Negative for abdominal pain.    Musculoskeletal: Positive for neck pain and neck stiffness. Negative for back pain and myalgias. Skin: Negative for rash. Neurological: Positive for headaches. Psychiatric/Behavioral: Negative for behavioral problems and dysphoric mood. Allergies   Allergen Reactions    Ace Inhibitors      Angioedema,  (on 12- pt denies any allergic reaction to any medications)     Allergy historyupdated.     Outpatient Medications Marked as Taking for the 10/6/20 encounter (Office Visit) with Carlos Najera MD   Medication Sig Dispense Refill    nabumetone (RELAFEN) 500 MG tablet Take 1 tablet by mouth 2 times daily as needed for Pain 40 tablet 1    tiZANidine (ZANAFLEX) 2 MG tablet Take 1 tablet by mouth nightly as needed (muscle pain) 20 tablet 1    [] amoxicillin-clavulanate (AUGMENTIN) 875-125 MG per tablet Take 1 tablet by mouth 2 times daily for 7 days 14 tablet 0    carvedilol (COREG) 6.25 MG tablet Take 0.5 tablets by mouth 2 times daily 60 tablet 3    amLODIPine (NORVASC) 10 MG tablet Take 1 tablet by mouth daily 30 tablet 3    Compression Stockings MISC by Does not apply route 1 each 0    simvastatin (ZOCOR) 40 MG tablet Take 1 tablet by mouth nightly 90 tablet 3    finasteride (PROSCAR) 5 MG tablet Take 1 tablet by mouth daily Please print sig in South African if available (South African speaking only patient) 90 tablet 3    omeprazole (PRILOSEC) 40 MG delayed release capsule Take 1 capsule by mouth every morning Please print sig in South African if available (South African speaking only patient) 90 capsule 3    DULoxetine (CYMBALTA) 20 MG extended release capsule TAKE 1 CAPSULE BY MOUTH ONE TIME A DAY  30 capsule 11    clopidogrel (PLAVIX) 75 MG tablet TAKE 1 TABLET BY MOUTH ONE TIME A DAY  30 tablet 11    tamsulosin (FLOMAX) 0.4 MG capsule Take 2 capsules by mouth nightly Please print sig in South African if available (South African speaking only patient) 60 capsule 11    furosemide (LASIX) 20 MG tablet Take 1 tablet by mouth 2 times daily Please print sig in [facility] [place] if available (Emirati speaking only patient) 60 tablet 11    guaiFENesin (MUCINEX) 600 MG extended release tablet Take 1 tablet by mouth 2 times daily To thin mucus (please give instructions in Polish) 60 tablet 1    Tiotropium Bromide-Olodaterol 2.5-2.5 MCG/ACT AERS Inhale 2 puffs into the lungs daily Please print sig in Emirati if available (Emirati speaking only patient)- Failed Anoro 4 g 5    umeclidinium-vilanterol (ANORO ELLIPTA) 62.5-25 MCG/INH AEPB inhaler Inhale 1 puff into the lungs daily 1 each 11    nitroGLYCERIN (NITROSTAT) 0.4 MG SL tablet Place 1 tablet under the tongue every 5 minutes as needed for Chest pain 25 tablet 3    aspirin 81 MG chewable tablet Take 1 tablet by mouth daily 30 tablet 3       Tobacco use history updated. Social History     Tobacco Use   Smoking Status Former Smoker    Packs/day: 2.50    Years: 65.00    Pack years: 162.50    Last attempt to quit: 2009    Years since quittin.1   Smokeless Tobacco Never Used   Tobacco Comment    Pt smoked for 65 yrs. Quit 9 yrs ago. Updated 18        Nursing note reviewed. Vitals:    10/06/20 0906   BP: (!) 180/80   Site: Left Upper Arm   Position: Sitting   Cuff Size: Medium Adult   Pulse: 93   SpO2: 97%   Weight: 245 lb (111.1 kg)          BP Readings from Last 3 Encounters:   10/06/20 (!) 180/80   20 (!) 150/80   20 138/80     Wt Readings from Last 3 Encounters:   10/06/20 245 lb (111.1 kg)   20 249 lb 3.2 oz (113 kg)   20 246 lb (111.6 kg)     Body mass index is 33.23 kg/m². No results found for this visit on 10/06/20. Physical Exam  Vitals signs and nursing note reviewed. Constitutional:       General: He is not in acute distress. Appearance: He is well-developed. He is not diaphoretic. HENT:      Head: Normocephalic.       Comments: Bruising and left eye swelling and laceration to nose, all at least a week old     Right Ear: External ear normal.      Left Ear: External ear normal.      Nose: Nose normal.      Mouth/Throat:      Pharynx: No oropharyngeal exudate. Eyes:      General:         Right eye: No discharge. Left eye: No discharge. Conjunctiva/sclera: Conjunctivae normal.      Pupils: Pupils are equal, round, and reactive to light. Neck:      Musculoskeletal: Normal range of motion and neck supple. Thyroid: No thyromegaly. Cardiovascular:      Rate and Rhythm: Normal rate and regular rhythm. Heart sounds: Normal heart sounds. No murmur. No gallop. Pulmonary:      Effort: Pulmonary effort is normal. No respiratory distress. Breath sounds: Normal breath sounds. No wheezing. Musculoskeletal: Normal range of motion. Lymphadenopathy:      Cervical: No cervical adenopathy. Skin:     General: Skin is warm and dry. Findings: Bruising (Bruising and abrasions to left hand and arm) present. Neurological:      Mental Status: He is alert and oriented to person, place, and time. Psychiatric:         Behavior: Behavior normal.             _________________________________________________  Assessment:     Juan Manuel Parnell was seen today for fall and headache. Diagnoses and all orders for this visit:    Neck pain  -     nabumetone (RELAFEN) 500 MG tablet; Take 1 tablet by mouth 2 times daily as needed for Pain  -     tiZANidine (ZANAFLEX) 2 MG tablet; Take 1 tablet by mouth nightly as needed (muscle pain)    Intractable episodic headache, unspecified headache type    Black eye of left side, initial encounter    Arm pain, anterior, left    Laceration of nose, initial encounter  -     amoxicillin-clavulanate (AUGMENTIN) 875-125 MG per tablet; Take 1 tablet by mouth 2 times daily for 7 days      Problems listed above are stable except as follows: Valuation for his injuries and we have not been able to assess his long-term headache after head injury.   I do not think he has any new symptoms and I do not think evaluation of it is critical so we will leave that alone for now. Patient states he does not have any money to live on it and assess for help with getting financial assistance prior to discharge. We will ask our ambulatory care nurse to refer him to social work if available. Therapeutic plan is unchanged unless otherwise specified. See orders above and comments below for details of workup or medication orders. _________________________________________________  Plan:     REQUEST HELP FROM [address]    Return in about 4 weeks (around 11/3/2020), or if symptoms worsen or fail to improve, for recheck bruising and social work followup-- make 40 mins.       Electronically signed by Soham Pacheco MD on 10/6/20 at 10:07 AM EDT

## 2020-10-18 ASSESSMENT — ENCOUNTER SYMPTOMS
WHEEZING: 0
CHEST TIGHTNESS: 0
SHORTNESS OF BREATH: 0
ABDOMINAL PAIN: 0
BACK PAIN: 0
COUGH: 0

## 2020-10-28 ENCOUNTER — CARE COORDINATION (OUTPATIENT)
Dept: CARE COORDINATION | Age: 85
End: 2020-10-28

## 2020-10-28 NOTE — CARE COORDINATION
ACM received referral from PCP asking for patient financial assistance programs. Pt seems to not be engaged in his health concerns due to financial restraints. ACM forwarded to [address] [place] [address] for additional help. ACM will follow up as necessary. PRINCE Ledezma RN  Ambulatory Care Manager  [phone] office/cell  [phone] fax  [email or web address]

## 2020-10-29 ENCOUNTER — CARE COORDINATION (OUTPATIENT)
Dept: CARE COORDINATION | Age: 85
End: 2020-10-29

## 2020-10-29 NOTE — CARE COORDINATION
Attempt to initiate SW referral. LVM using .    MARILYN Alvarenga, [facility]   [phone]

## 2020-11-06 ENCOUNTER — CARE COORDINATION (OUTPATIENT)
Dept: CARE COORDINATION | Age: 85
End: 2020-11-06

## 2020-11-06 NOTE — CARE COORDINATION
Attempted outreach to follow up on PCP referral for Care Coordination. Using  #[id number], ALEXANDRO with ACM contact information at pt home #. Attempted outreach at pt mobile #, where someone picked up but would not answer the  & then disconnected. No further outreach at this time. TIMOTHY MezaN RN  Ambulatory Care Manager  [phone] office/cell  [phone] fax  [email or web address]

## 2020-11-10 NOTE — CARE COORDINATION
2nd f/u attempt using . Phone was answered then disconnected. Final attempt next week.   MARILYN Swanson, [facility]   [phone]

## 2020-11-19 ENCOUNTER — CARE COORDINATION (OUTPATIENT)
Dept: CARE COORDINATION | Age: 85
End: 2020-11-19

## 2020-11-19 NOTE — CARE COORDINATION
Call to pt to initiate SW referral. Pt reported that he receives $900 in SS and pays $500/mo. Pt expressed that his immediate need is food because of limited income and not being able to go to the store as frequently. Explained to pt that he has an essential plan where he can add one benefit to his plan, home delivered meals up to 64 a year being one of them. SW offered to contact pt tomorrow to conference him in with insurance company using . Pt was grateful and agreed.   Bob Huntley, MARILYN, [facility]   [phone]

## 2020-11-23 ENCOUNTER — CARE COORDINATION (OUTPATIENT)
Dept: CARE COORDINATION | Age: 85
End: 2020-11-23

## 2020-11-23 NOTE — CARE COORDINATION
Incoming call from Jordi Velasco at [employer]. She indicated that pt can start receiving meals today as long as he will be home. Informed Twilaew that pt is  and does not speak any English however SW will relay the messages using . Meals will be delivered on Mondays between 12-1. Updated pt with info. He was appreciative. SW will f/u in two weeks to check on meals from [employer].   MARILYN Evans, [facility]   [phone]

## 2020-12-02 ENCOUNTER — TELEPHONE (OUTPATIENT)
Dept: FAMILY MEDICINE CLINIC | Age: 85
End: 2020-12-02

## 2020-12-02 NOTE — TELEPHONE ENCOUNTER
Froilan Richardson from [employer] called and she is asking for a phone number to an emergency contact.  Lm on her vm with pt son Number

## 2020-12-10 ENCOUNTER — CARE COORDINATION (OUTPATIENT)
Dept: CARE COORDINATION | Age: 85
End: 2020-12-10

## 2020-12-10 NOTE — CARE COORDINATION
F/u call to pt regarding home delivered meals. Pt reported that he picked up meals once and was unable to go back due to health reasons. He stated that he had not received any meals to his home, from [employer] through insurance or MOW. SW contacted [employer], informed that info was not sent from insurance. WEN contacted [employer]. Spoke to Yulisa Murillo, he reported that all info had been sent however he will ensure that it is resent to [employer]. Next step would be [employer] calling pt to arrange delivery date. WEN contacted [facility], Chrishailey Zieglerlukas will be out until Monday. Informed that pt is signed up to receive drive thru meals. Explained that pt was unable to  meals. WEN will reach out to [facility] next week.       Caitlyn Zambrano MSW, [facility]   [phone]

## 2020-12-17 NOTE — CARE COORDINATION
Call to [facility] for update on meals. They reported that referral from [address] had not been received. SW contacted [employer], explained that call was made 11.20 & 12.10 requesting info be [facility] to [facility]. [facility] offered SW to speak with precert dept for additional assistance, on hold for long period of time. SW contacted Chyna at [facility]. Informed that pt has received meals delivered last week and this week. Contacted pt, unable to reach, LVM. Will try again next week.     MARILYN Leslie, [facility]   [phone]

## 2020-12-18 ENCOUNTER — OFFICE VISIT (OUTPATIENT)
Dept: FAMILY MEDICINE CLINIC | Age: 85
End: 2020-12-18
Payer: MEDICARE

## 2020-12-18 VITALS
HEART RATE: 83 BPM | DIASTOLIC BLOOD PRESSURE: 82 MMHG | WEIGHT: 246 LBS | OXYGEN SATURATION: 93 % | TEMPERATURE: 96.7 F | BODY MASS INDEX: 33.36 KG/M2 | SYSTOLIC BLOOD PRESSURE: 138 MMHG

## 2020-12-18 LAB — HBA1C MFR BLD: 5.9 %

## 2020-12-18 PROCEDURE — 83036 HEMOGLOBIN GLYCOSYLATED A1C: CPT | Performed by: NURSE PRACTITIONER

## 2020-12-18 PROCEDURE — 99213 OFFICE O/P EST LOW 20 MIN: CPT | Performed by: NURSE PRACTITIONER

## 2020-12-18 ASSESSMENT — ENCOUNTER SYMPTOMS
ABDOMINAL DISTENTION: 0
BACK PAIN: 0
VOMITING: 0
NAUSEA: 0
SHORTNESS OF BREATH: 0

## 2020-12-18 NOTE — PROGRESS NOTES
 Hx of cardiovascular stress test 2018    EF 52%, Abn stress images, Mild small-size lateral wall perfusion, Mod inferior wall ischemia.  Hypertension     Insomnia     Kidney stones     CT scan 3/29/2010    Liver cyst     CT scan 3/29/2010    Lumbar disc disease 2012    Dr. Claudine Shah, will refer to Dr. Ritu Mclain for eval    Lumbar stenosis     CT scan 3/29/2010    Nocturia     Orchitis and epididymitis, unspecified 2012    Dr. Colón Pearagini    Pain in left testicle     Pancreatic cyst     CT scan 3/29/2010    Spinal stenosis 2012    severe-Dr. Allean Schwab Suicide attempt [facility]) 3/2005    drug overdose with  Zestoretic    Tachycardia     \"saw Dr , not sure of his name, did tests and did not find anything\"\"that was about 4 months ago\"\"no more episodes since had work up\"    Ulcer     hx of ulcers\"three years ago but ok now\"(pc)     Social History     Socioeconomic History    Marital status: Legally      Spouse name: Not on file    Number of children: Not on file    Years of education: Not on file    Highest education level: Not on file   Occupational History    Not on file   Social Needs    Financial resource strain: Not on file    Food insecurity     Worry: Not on file     Inability: Not on file    Transportation needs     Medical: Not on file     Non-medical: Not on file   Tobacco Use    Smoking status: Former Smoker     Packs/day: 2.50     Years: 65.00     Pack years: 162.50     Quit date: 2009     Years since quittin.3    Smokeless tobacco: Never Used    Tobacco comment: Pt smoked for 65 yrs. Quit 9 yrs ago.  Updated 18   Substance and Sexual Activity    Alcohol use: No     Alcohol/week: 4.2 standard drinks     Types: 5 Standard drinks or equivalent per week    Drug use: Not on file    Sexual activity: Not on file   Lifestyle    Physical activity     Days per week: Not on file     Minutes per session: Not on file    Stress: Not on file   Relationships  Social connections     Talks on phone: Not on file     Gets together: Not on file     Attends Islam service: Not on file     Active member of club or organization: Not on file     Attends meetings of clubs or organizations: Not on file     Relationship status: Not on file    Intimate partner violence     Fear of current or ex partner: Not on file     Emotionally abused: Not on file     Physically abused: Not on file     Forced sexual activity: Not on file   Other Topics Concern    Not on file   Social History Narrative    Not on file     No family history on file. Past Surgical History:   Procedure Laterality Date    CHOLECYSTECTOMY  42 yrs ago    CYSTOSCOPY  4/2010    ENDOSCOPY, COLON, DIAGNOSTIC      EYE SURGERY      cataract ext carroll    HERNIA REPAIR  over 12 yrs ago    Bilateral inguinal hernia repair    PTCA  [address]    [address]    PULMONARY STRESS TEST  3/1/12    Normal; ejection fraction 70%; exercise capacity 4.6 METS    TONSILLECTOMY          Review of Systems   Constitutional: Negative for fatigue and unexpected weight change. HENT: Negative for congestion. Respiratory: Negative for shortness of breath. Cardiovascular: Negative for chest pain, palpitations and leg swelling. Gastrointestinal: Negative for abdominal distention, nausea and vomiting. Genitourinary: Positive for frequency. Negative for difficulty urinating, discharge, dysuria, hematuria and urgency. Musculoskeletal: Negative for back pain and gait problem. Neurological: Negative for dizziness, weakness and headaches. Psychiatric/Behavioral: Negative for agitation and sleep disturbance. The patient is not nervous/anxious.         OBJECTIVE:  /82 (Site: Left Upper Arm, Position: Sitting, Cuff Size: Large Adult)   Pulse 83   Temp 96.7 °F (35.9 °C)   Wt 246 lb (111.6 kg)   SpO2 93%   BMI 33.36 kg/m²   BP Readings from Last 3 Encounters:   12/18/20 138/82   10/06/20 (!) 180/80   09/24/20 (!) 150/80     Wt Readings from Last 3 Encounters:   12/18/20 246 lb (111.6 kg)   10/06/20 245 lb (111.1 kg)   09/24/20 249 lb 3.2 oz (113 kg)     Body mass index is 33.36 kg/m². Physical Exam  Vitals signs and nursing note reviewed. Constitutional:       General: He is not in acute distress. Appearance: Normal appearance. He is well-developed. He is obese. He is not ill-appearing, toxic-appearing or diaphoretic. HENT:      Head: Normocephalic and atraumatic. Right Ear: External ear normal.      Left Ear: External ear normal.      Nose: Nose normal.      Mouth/Throat:      Mouth: Mucous membranes are moist.   Eyes:      Conjunctiva/sclera: Conjunctivae normal.      Pupils: Pupils are equal, round, and reactive to light. Neck:      Musculoskeletal: Normal range of motion and neck supple. Cardiovascular:      Rate and Rhythm: Normal rate and regular rhythm. Heart sounds: Normal heart sounds. Pulmonary:      Effort: Pulmonary effort is normal.      Breath sounds: Normal breath sounds. Abdominal:      General: Bowel sounds are normal.      Palpations: Abdomen is soft. Musculoskeletal: Normal range of motion. Skin:     General: Skin is warm and dry. Capillary Refill: Capillary refill takes less than 2 seconds. Neurological:      Mental Status: He is alert and oriented to person, place, and time. Deep Tendon Reflexes: Reflexes are normal and symmetric. Psychiatric:         Mood and Affect: Mood normal.         Behavior: Behavior normal.         Thought Content: Thought content normal.         Judgment: Judgment normal.         ASSESSMENT/PLAN:    1. Urinary frequency  - POCT glycosylated hemoglobin (Hb A1C) (WNL)  Patient instructed in proper dosage of flomax to adjust to  Advised to call urology for follow up appointment    2.  Screening for diabetes mellitus  - POCT glycosylated hemoglobin (Hb A1C) (WNL)  Reassurance given to patient      Orders Placed This Encounter   Procedures    POCT glycosylated hemoglobin (Hb A1C)     Current Outpatient Medications   Medication Sig Dispense Refill    carvedilol (COREG) 6.25 MG tablet Take 0.5 tablets by mouth 2 times daily 60 tablet 3    amLODIPine (NORVASC) 10 MG tablet Take 1 tablet by mouth daily 30 tablet 3    Compression Stockings MISC by Does not apply route 1 each 0    simvastatin (ZOCOR) 40 MG tablet Take 1 tablet by mouth nightly 90 tablet 3    finasteride (PROSCAR) 5 MG tablet Take 1 tablet by mouth daily Please print sig in North Korean if available (North Korean speaking only patient) 90 tablet 3    omeprazole (PRILOSEC) 40 MG delayed release capsule Take 1 capsule by mouth every morning Please print sig in North Korean if available (North Korean speaking only patient) 90 capsule 3    DULoxetine (CYMBALTA) 20 MG extended release capsule TAKE 1 CAPSULE BY MOUTH ONE TIME A DAY  30 capsule 11    clopidogrel (PLAVIX) 75 MG tablet TAKE 1 TABLET BY MOUTH ONE TIME A DAY  30 tablet 11    tamsulosin (FLOMAX) 0.4 MG capsule Take 2 capsules by mouth nightly Please print sig in North Korean if available (North Korean speaking only patient) 60 capsule 11    furosemide (LASIX) 20 MG tablet Take 1 tablet by mouth 2 times daily Please print sig in North Korean if available (North Korean speaking only patient) 60 tablet 11    nitroGLYCERIN (NITROSTAT) 0.4 MG SL tablet Place 1 tablet under the tongue every 5 minutes as needed for Chest pain 25 tablet 3    aspirin 81 MG chewable tablet Take 1 tablet by mouth daily 30 tablet 3    nabumetone (RELAFEN) 500 MG tablet Take 1 tablet by mouth 2 times daily as needed for Pain (Patient not taking: Reported on 12/18/2020) 40 tablet 1     No current facility-administered medications for this visit. Return in about 3 months (around 3/18/2021) for hypertension. Brayden Miller DNP, FNP-C    Return for new or worsening symptoms or any concerns as needed.

## 2020-12-21 ENCOUNTER — CARE COORDINATION (OUTPATIENT)
Dept: CARE COORDINATION | Age: 85
End: 2020-12-21

## 2020-12-21 NOTE — CARE COORDINATION
F/u call made to pt. He reported that he is no longer interested in the home delivered meals. He stated that he prefers meals with meat, potatoes and beans, inquired if only beverages can be delivered. Explained that the beverages came with the meals and cannot be delivered separately. Inquired if pt would like to receive meals from insurance company, explained that they may be similar. Pt declined meals. Thanked SW for assistance. SW contacted [employer] to stop meals. Final outreach.   Manual MARILYN Matute, [facility] [place]   [phone]

## 2021-03-23 ENCOUNTER — OFFICE VISIT (OUTPATIENT)
Dept: FAMILY MEDICINE CLINIC | Age: 86
End: 2021-03-23
Payer: MEDICARE

## 2021-03-23 VITALS
BODY MASS INDEX: 32.32 KG/M2 | DIASTOLIC BLOOD PRESSURE: 72 MMHG | HEART RATE: 77 BPM | WEIGHT: 238.6 LBS | HEIGHT: 72 IN | OXYGEN SATURATION: 96 % | SYSTOLIC BLOOD PRESSURE: 168 MMHG

## 2021-03-23 DIAGNOSIS — K21.9 GASTROESOPHAGEAL REFLUX DISEASE WITHOUT ESOPHAGITIS: ICD-10-CM

## 2021-03-23 DIAGNOSIS — E78.2 MIXED HYPERLIPIDEMIA: ICD-10-CM

## 2021-03-23 DIAGNOSIS — I25.110 CORONARY ARTERY DISEASE INVOLVING NATIVE CORONARY ARTERY OF NATIVE HEART WITH UNSTABLE ANGINA PECTORIS (HCC): ICD-10-CM

## 2021-03-23 DIAGNOSIS — I10 ESSENTIAL HYPERTENSION: Primary | ICD-10-CM

## 2021-03-23 DIAGNOSIS — H92.01 OTALGIA, RIGHT: ICD-10-CM

## 2021-03-23 PROCEDURE — 99214 OFFICE O/P EST MOD 30 MIN: CPT | Performed by: FAMILY MEDICINE

## 2021-03-23 ASSESSMENT — PATIENT HEALTH QUESTIONNAIRE - PHQ9
SUM OF ALL RESPONSES TO PHQ9 QUESTIONS 1 & 2: 0
SUM OF ALL RESPONSES TO PHQ QUESTIONS 1-9: 0
2. FEELING DOWN, DEPRESSED OR HOPELESS: 0
1. LITTLE INTEREST OR PLEASURE IN DOING THINGS: 0
SUM OF ALL RESPONSES TO PHQ QUESTIONS 1-9: 0
SUM OF ALL RESPONSES TO PHQ QUESTIONS 1-9: 0

## 2021-03-23 NOTE — PROGRESS NOTES
Barbara Bell  1935 03/24/21    Chief Complaint   Patient presents with    3 Month Follow-Up    Hypertension    Otalgia     right ear pain last 2 months           Patient here for 3 months f/u regarding his HTN, HLD, GERD, and also c/o R ear pain, patient difficult to communicate with him because can not speak english, pain going on for 2 months, no headache, no cough, no congestion. The patient is taking hypertensive medications compliantly without side effects. Denies chest pain, dyspnea, edema, or TIA's. He is taking cholesterol medication and taking his GERD medication. Past Medical History:   Diagnosis Date    Abnormal PFTs (pulmonary function tests)     PFT 10/09 3.21 (98%) / 4.97 (115%)=0.65    BPH     Mod BPH 40 gm prostate- Cystourethroscopy 4/2010- Dr. Yarelis Cooper    COPD, [facility]) 2/28/2018    Coronary artery disease involving native coronary artery of native heart with unstable angina pectoris ([facility] [address]     Depression     Pt has refused Rx in the past    DJD (degenerative joint disease), lumbar     CT scan 3/29/2010    Dyspnea on exertion 10/4/2018    Former smoker 2/28/2018    H/O echocardiogram 05/11/2018    EF 50-60%, Mild LVH, Grade 1 diastolic dysfunction, Sclerotice but non-stenotic AV.  H/O echocardiogram 09/10/2018    Limited- EF 50-55%, AV sclerotic but not stenotic, visualized MR, TR, and CO. Dilated aortic root.  History of nuclear stress test 08/07/2018    cardiolite-normal    Hx of cardiovascular stress test 05/11/2018    EF 52%, Abn stress images, Mild small-size lateral wall perfusion, Mod inferior wall ischemia.     Hypertension 1996    Insomnia     Kidney stones     CT scan 3/29/2010    Liver cyst     CT scan 3/29/2010    Lumbar disc disease 8/2012    Dr. Lalo Figueroa, will refer to Dr. Carissa Lester for eval    Lumbar stenosis     CT scan 3/29/2010    Mixed hyperlipidemia 3/24/2021    Nocturia     Orchitis and epididymitis, unspecified 8/2012    Dr. Vielka Hunt Pain in left testicle     Pancreatic cyst     CT scan 3/29/2010    Spinal stenosis 2012    severe-Dr. Carole Silva Suicide attempt [facility]) 3/2005    drug overdose with  Zestoretic    Tachycardia     \"saw Dr , not sure of his name, did tests and did not find anything\"\"that was about 4 months ago\"\"no more episodes since had work up\"    Ulcer     hx of ulcers\"three years ago but ok now\"(pc)     Past Surgical History:   Procedure Laterality Date    CHOLECYSTECTOMY  42 yrs ago    CYSTOSCOPY  2010    ENDOSCOPY, COLON, DIAGNOSTIC      EYE SURGERY      cataract ext carroll    HERNIA REPAIR  over 12 yrs ago    Bilateral inguinal hernia repair    [facility]  [id number]    [facility]    PULMONARY STRESS TEST  3/1/12    Normal; ejection fraction 70%; exercise capacity 4.6 METS    TONSILLECTOMY       No family history on file. Social History     Socioeconomic History    Marital status: Legally      Spouse name: Not on file    Number of children: Not on file    Years of education: Not on file    Highest education level: Not on file   Occupational History    Not on file   Social Needs    Financial resource strain: Not on file    Food insecurity     Worry: Not on file     Inability: Not on file    Transportation needs     Medical: Not on file     Non-medical: Not on file   Tobacco Use    Smoking status: Former Smoker     Packs/day: 2.50     Years: 65.00     Pack years: 162.50     Quit date: 2009     Years since quittin.5    Smokeless tobacco: Never Used    Tobacco comment: Pt smoked for 65 yrs. Quit 9 yrs ago.  Updated 18   Substance and Sexual Activity    Alcohol use: No     Alcohol/week: 4.2 standard drinks     Types: 5 Standard drinks or equivalent per week    Drug use: Not on file    Sexual activity: Not on file   Lifestyle    Physical activity     Days per week: Not on file     Minutes per session: Not on file    Stress: Not on file   Relationships    Social connections     Talks on phone: Not on file     Gets together: Not on file     Attends Jewish service: Not on file     Active member of club or organization: Not on file     Attends meetings of clubs or organizations: Not on file     Relationship status: Not on file    Intimate partner violence     Fear of current or ex partner: Not on file     Emotionally abused: Not on file     Physically abused: Not on file     Forced sexual activity: Not on file   Other Topics Concern    Not on file   Social History Narrative    Not on file       Allergies   Allergen Reactions    Ace Inhibitors      Angioedema, 8/05/ (on 9/26/12- pt denies any allergic reaction to any medications)     Current Outpatient Medications   Medication Sig Dispense Refill    neomycin-polymyxin-hydrocortisone (CORTISPORIN) 3.5-01604-0 otic solution Place 4 drops into the right ear 3 times daily for 5 days 1 Bottle 0    nabumetone (RELAFEN) 500 MG tablet Take 1 tablet by mouth 2 times daily as needed for Pain 40 tablet 1    carvedilol (COREG) 6.25 MG tablet Take 0.5 tablets by mouth 2 times daily 60 tablet 3    amLODIPine (NORVASC) 10 MG tablet Take 1 tablet by mouth daily 30 tablet 3    simvastatin (ZOCOR) 40 MG tablet Take 1 tablet by mouth nightly 90 tablet 3    finasteride (PROSCAR) 5 MG tablet Take 1 tablet by mouth daily Please print sig in Turkmen if available (Turkmen speaking only patient) 90 tablet 3    omeprazole (PRILOSEC) 40 MG delayed release capsule Take 1 capsule by mouth every morning Please print sig in Turkmen if available (Turkmen speaking only patient) 90 capsule 3    DULoxetine (CYMBALTA) 20 MG extended release capsule TAKE 1 CAPSULE BY MOUTH ONE TIME A DAY  30 capsule 11    clopidogrel (PLAVIX) 75 MG tablet TAKE 1 TABLET BY MOUTH ONE TIME A DAY  30 tablet 11    tamsulosin (FLOMAX) 0.4 MG capsule Take 2 capsules by mouth nightly Please print sig in Turkmen if available (Turkmen speaking only patient) 60 capsule 11    furosemide (LASIX) 20 MG tablet Take 1 tablet by mouth 2 times daily Please print sig in Turkish if available (Turkish speaking only patient) 60 tablet 11    nitroGLYCERIN (NITROSTAT) 0.4 MG SL tablet Place 1 tablet under the tongue every 5 minutes as needed for Chest pain 25 tablet 3    aspirin 81 MG chewable tablet Take 1 tablet by mouth daily 30 tablet 3    Compression Stockings MISC by Does not apply route (Patient not taking: Reported on 3/23/2021) 1 each 0     No current facility-administered medications for this visit. Review of Systems   Constitutional: Negative for activity change, chills and fever. HENT: Positive for ear pain (R one). Negative for congestion, hearing loss, postnasal drip, sinus pressure, sinus pain and sore throat. Respiratory: Negative for cough and shortness of breath. Cardiovascular: Negative for chest pain and leg swelling. Gastrointestinal: Negative for abdominal pain. Genitourinary: Negative for dysuria. Neurological: Negative for dizziness and headaches. Psychiatric/Behavioral: Negative for agitation. The patient is not nervous/anxious.         Lab Results   Component Value Date    WBC 8.7 07/16/2018    HGB 15.1 07/16/2018    HCT 45.0 07/16/2018    MCV 96.4 07/16/2018     07/16/2018     Lab Results   Component Value Date     08/14/2020    K 4.8 08/14/2020     08/14/2020    CO2 27 08/14/2020    BUN 21 (H) 08/14/2020    CREATININE 1.2 08/14/2020    GLUCOSE 104 (H) 08/14/2020    CALCIUM 9.5 08/14/2020    PROT 6.5 07/16/2018    LABALBU 4.1 07/16/2018    BILITOT 0.4 07/16/2018    ALKPHOS 105 07/16/2018     (H) 07/16/2018     (H) 07/16/2018    LABGLOM 58 (A) 08/14/2020    GFRAA >60 08/14/2020    AGRATIO 1.6 12/22/2017    GLOB 2.7 12/22/2017     Lab Results   Component Value Date    CHOL 161 02/08/2019    CHOL 212 (H) 07/16/2018    CHOL 210 (H) 03/02/2012     Lab Results   Component Value Date    TRIG 78 02/08/2019    TRIG 83 07/16/2018    TRIG 143 03/02/2012     Lab Results   Component Value Date    HDL 62 02/08/2019    HDL 64 07/16/2018    HDL 50 (L) 03/02/2012     Lab Results   Component Value Date    LDLCALC 131 (H) 03/02/2012     Lab Results   Component Value Date    LABA1C 5.9 12/18/2020     Lab Results   Component Value Date    TSH 5.27 (H) 04/03/2018         BP (!) 168/72   Pulse 77   Ht 6' (1.829 m)   Wt 238 lb 9.6 oz (108.2 kg)   SpO2 96%   BMI 32.36 kg/m²     BP Readings from Last 3 Encounters:   03/23/21 (!) 168/72   12/18/20 138/82   10/06/20 (!) 180/80       Wt Readings from Last 3 Encounters:   03/23/21 238 lb 9.6 oz (108.2 kg)   12/18/20 246 lb (111.6 kg)   10/06/20 245 lb (111.1 kg)         Physical Exam  Constitutional:       General: He is not in acute distress. Appearance: Normal appearance. He is not ill-appearing or diaphoretic. HENT:      Head: Normocephalic and atraumatic. Right Ear: Tympanic membrane, ear canal and external ear normal. There is no impacted cerumen. Left Ear: Tympanic membrane, ear canal and external ear normal. There is no impacted cerumen. Mouth/Throat:      Mouth: Mucous membranes are moist.   Eyes:      General: No scleral icterus. Extraocular Movements: Extraocular movements intact. Pupils: Pupils are equal, round, and reactive to light. Neck:      Musculoskeletal: Normal range of motion and neck supple. Cardiovascular:      Rate and Rhythm: Normal rate and regular rhythm. Heart sounds: No murmur. Pulmonary:      Effort: Pulmonary effort is normal.      Breath sounds: Normal breath sounds. No wheezing or rhonchi. Musculoskeletal:      Right lower leg: Edema (1+) present. Left lower leg: Edema (1+) present. Neurological:      General: No focal deficit present. Mental Status: He is alert and oriented to person, place, and time. Psychiatric:         Mood and Affect: Mood normal.         Behavior: Behavior normal.         ASSESSMENT/ PLAN:    1.  Essential hypertension  - blood pressure high, difficult to under stand, bring him back with the intrepeter    2. Mixed hyperlipidemia  - stable    3. Coronary artery disease involving native coronary artery of native heart with unstable angina pectoris (HCC)  - stable    4. Gastroesophageal reflux disease without esophagitis  - stable    5. Otalgia, right  - normal exam, but will try:  - neomycin-polymyxin-hydrocortisone (CORTISPORIN) 3.5-63367-2 otic solution; Place 4 drops into the right ear 3 times daily for 5 days  Dispense: 1 Bottle; Refill: 0  Call the clinic if getting worse            - All old blood work reviewed with the patient  - Appropriate prescription are addressed. - After visit summery provided. - Questions answered and patient verbalizes understanding.  - Call for any problem, questions, or concerns. Return in about 1 month (around 4/23/2021) for medicare wellness and OV.

## 2021-03-24 PROBLEM — K21.9 GASTROESOPHAGEAL REFLUX DISEASE WITHOUT ESOPHAGITIS: Status: ACTIVE | Noted: 2021-03-24

## 2021-03-24 PROBLEM — E78.2 MIXED HYPERLIPIDEMIA: Status: ACTIVE | Noted: 2021-03-24

## 2021-03-24 ASSESSMENT — ENCOUNTER SYMPTOMS
SHORTNESS OF BREATH: 0
SINUS PAIN: 0
SINUS PRESSURE: 0
COUGH: 0
ABDOMINAL PAIN: 0
SORE THROAT: 0

## 2021-04-06 DIAGNOSIS — I10 ESSENTIAL HYPERTENSION: ICD-10-CM

## 2021-04-06 RX ORDER — AMLODIPINE BESYLATE 10 MG/1
10 TABLET ORAL DAILY
Qty: 30 TABLET | Refills: 5 | Status: SHIPPED | OUTPATIENT
Start: 2021-04-06 | End: 2021-08-09 | Stop reason: SDUPTHER

## 2021-04-20 ENCOUNTER — TELEPHONE (OUTPATIENT)
Dept: FAMILY MEDICINE CLINIC | Age: 86
End: 2021-04-20

## 2021-04-21 NOTE — TELEPHONE ENCOUNTER
She is new to me, just change the PCP, and will give her another chance, please call the patient and see why she is no show

## 2021-08-09 DIAGNOSIS — K21.9 GASTROESOPHAGEAL REFLUX DISEASE WITHOUT ESOPHAGITIS: ICD-10-CM

## 2021-08-09 DIAGNOSIS — I10 ESSENTIAL HYPERTENSION: ICD-10-CM

## 2021-08-09 RX ORDER — CARVEDILOL 6.25 MG/1
3.12 TABLET ORAL 2 TIMES DAILY
Qty: 60 TABLET | Refills: 3 | Status: SHIPPED | OUTPATIENT
Start: 2021-08-09

## 2021-08-09 RX ORDER — CLOPIDOGREL BISULFATE 75 MG/1
TABLET ORAL
Qty: 30 TABLET | Refills: 5 | Status: SHIPPED | OUTPATIENT
Start: 2021-08-09

## 2021-08-09 RX ORDER — OMEPRAZOLE 40 MG/1
40 CAPSULE, DELAYED RELEASE ORAL EVERY MORNING
Qty: 90 CAPSULE | Refills: 3 | Status: SHIPPED | OUTPATIENT
Start: 2021-08-09

## 2021-08-09 RX ORDER — AMLODIPINE BESYLATE 10 MG/1
10 TABLET ORAL DAILY
Qty: 30 TABLET | Refills: 5 | Status: SHIPPED | OUTPATIENT
Start: 2021-08-09 | End: 2022-07-25 | Stop reason: SDUPTHER

## 2021-10-06 ENCOUNTER — OFFICE VISIT (OUTPATIENT)
Dept: FAMILY MEDICINE CLINIC | Age: 86
End: 2021-10-06
Payer: MEDICARE

## 2021-10-06 VITALS
WEIGHT: 231 LBS | SYSTOLIC BLOOD PRESSURE: 200 MMHG | HEART RATE: 70 BPM | BODY MASS INDEX: 31.29 KG/M2 | OXYGEN SATURATION: 95 % | HEIGHT: 72 IN | DIASTOLIC BLOOD PRESSURE: 100 MMHG

## 2021-10-06 DIAGNOSIS — E78.2 MIXED HYPERLIPIDEMIA: ICD-10-CM

## 2021-10-06 DIAGNOSIS — I10 ESSENTIAL HYPERTENSION: Primary | ICD-10-CM

## 2021-10-06 DIAGNOSIS — K21.9 GASTROESOPHAGEAL REFLUX DISEASE WITHOUT ESOPHAGITIS: ICD-10-CM

## 2021-10-06 DIAGNOSIS — J44.9 CHRONIC OBSTRUCTIVE PULMONARY DISEASE, UNSPECIFIED COPD TYPE (HCC): ICD-10-CM

## 2021-10-06 DIAGNOSIS — R51.9 NONINTRACTABLE HEADACHE, UNSPECIFIED CHRONICITY PATTERN, UNSPECIFIED HEADACHE TYPE: ICD-10-CM

## 2021-10-06 PROCEDURE — 99214 OFFICE O/P EST MOD 30 MIN: CPT | Performed by: FAMILY MEDICINE

## 2021-10-06 RX ORDER — LOSARTAN POTASSIUM 50 MG/1
50 TABLET ORAL DAILY
Qty: 90 TABLET | Refills: 1 | Status: SHIPPED | OUTPATIENT
Start: 2021-10-06 | End: 2022-07-25 | Stop reason: SDUPTHER

## 2021-10-06 NOTE — PROGRESS NOTES
Dalia Martínez  1935  10/10/21    Chief Complaint   Patient presents with    Pain     in the head. going from the base of the neck all the way to the front of his forehead.  6 Month Follow-Up    Hypertension    Hyperlipidemia    COPD    Gastroesophageal Reflux           Patient here for f/u regarding  HTN, HLD, COPD, and GERD, came today also c/o: headache, dose not know his medications, we use the  ipad. Headache   This is a new (on chronic) problem. The problem occurs daily. The problem has been gradually worsening. The pain is located in the bilateral region. The quality of the pain is described as aching and throbbing. The pain is at a severity of 9/10. Associated symptoms include scalp tenderness. Pertinent negatives include no abdominal pain, abnormal behavior, anorexia, back pain, blurred vision, coughing, dizziness, facial sweating, fever, hearing loss, nausea, numbness, seizures, swollen glands, vomiting, weakness or weight loss. Nothing aggravates the symptoms. He has tried acetaminophen for the symptoms. The treatment provided no relief. His past medical history is significant for hypertension. Past Medical History:   Diagnosis Date    Abnormal PFTs (pulmonary function tests)     PFT 10/09 3.21 (98%) / 4.97 (115%)=0.65    BPH     Mod BPH 40 gm prostate- Cystourethroscopy 4/2010- Dr. Emmanuelle Selby    COPD, [facility]) 2/28/2018    Coronary artery disease involving native coronary artery of native heart with unstable angina pectoris ([address]     Depression     Pt has refused Rx in the past    DJD (degenerative joint disease), lumbar     CT scan 3/29/2010    Dyspnea on exertion 10/4/2018    Former smoker 2/28/2018    H/O echocardiogram 05/11/2018    EF 50-60%, Mild LVH, Grade 1 diastolic dysfunction, Sclerotice but non-stenotic AV.  H/O echocardiogram 09/10/2018    Limited- EF 50-55%, AV sclerotic but not stenotic, visualized MR, TR, and NV. Dilated aortic root.     History of nuclear stress test 2018    cardiolite-normal    Hx of cardiovascular stress test 2018    EF 52%, Abn stress images, Mild small-size lateral wall perfusion, Mod inferior wall ischemia.  Hypertension     Insomnia     Kidney stones     CT scan 3/29/2010    Liver cyst     CT scan 3/29/2010    Lumbar disc disease 2012    Dr. Rima Madison, will refer to Dr. Jeremiah Brown for eval    Lumbar stenosis     CT scan 3/29/2010    Mixed hyperlipidemia 3/24/2021    Nocturia     Orchitis and epididymitis, unspecified 2012    Dr. Felicita Dubin    Pain in left testicle     Pancreatic cyst     CT scan 3/29/2010    Spinal stenosis 2012    severe-Dr. Arnie Henson Suicide attempt [facility]) 3/2005    drug overdose with  Zestoretic    Tachycardia     \"saw Dr , not sure of his name, did tests and did not find anything\"\"that was about 4 months ago\"\"no more episodes since had work up\"    Ulcer     hx of ulcers\"three years ago but ok now\"(pc)     Past Surgical History:   Procedure Laterality Date    CHOLECYSTECTOMY  42 yrs ago    CYSTOSCOPY  2010    ENDOSCOPY, COLON, DIAGNOSTIC      EYE SURGERY      cataract ext carroll    HERNIA REPAIR  over 12 yrs ago    Bilateral inguinal hernia repair    [facility]    PULMONARY STRESS TEST  3/1/12    Normal; ejection fraction 70%; exercise capacity 4.6 METS    TONSILLECTOMY       No family history on file. Social History     Socioeconomic History    Marital status: Legally      Spouse name: Not on file    Number of children: Not on file    Years of education: Not on file    Highest education level: Not on file   Occupational History    Not on file   Tobacco Use    Smoking status: Former Smoker     Packs/day: 2.50     Years: 65.00     Pack years: 162.50     Quit date: 2009     Years since quittin.1    Smokeless tobacco: Never Used    Tobacco comment: Pt smoked for 65 yrs. Quit 9 yrs ago.  Updated 18   Substance and Sexual Activity    Alcohol use: No     Alcohol/week: 4.2 standard drinks     Types: 5 Standard drinks or equivalent per week    Drug use: Not on file    Sexual activity: Not on file   Other Topics Concern    Not on file   Social History Narrative    Not on file     Social Determinants of Health     Financial Resource Strain:     Difficulty of Paying Living Expenses:    Food Insecurity:     Worried About Running Out of Food in the Last Year:     [address] in the Last Year:    Transportation Needs:     Lack of Transportation (Medical):  Lack of Transportation (Non-Medical):    Physical Activity:     Days of Exercise per Week:     Minutes of Exercise per Session:    Stress:     Feeling of Stress :    Social Connections:     Frequency of Communication with Friends and Family:     Frequency of Social Gatherings with Friends and Family:     Attends Oriental orthodox Services:     Active Member of Clubs or Organizations:     Attends Club or Organization Meetings:     Marital Status:    Intimate Partner Violence:     Fear of Current or Ex-Partner:     Emotionally Abused:     Physically Abused:     Sexually Abused:         Allergies   Allergen Reactions    Ace Inhibitors      Angioedema, 8/05/ (on 9/26/12- pt denies any allergic reaction to any medications)     Current Outpatient Medications   Medication Sig Dispense Refill    losartan (COZAAR) 50 MG tablet Take 1 tablet by mouth daily 90 tablet 1    omeprazole (PRILOSEC) 40 MG delayed release capsule Take 1 capsule by mouth every morning Please print sig in Bulgarian if available (Bulgarian speaking only patient) 90 capsule 3    carvedilol (COREG) 6.25 MG tablet Take 0.5 tablets by mouth 2 times daily 60 tablet 3    clopidogrel (PLAVIX) 75 MG tablet TAKE 1 TABLET BY MOUTH ONE TIME A DAY 30 tablet 5    amLODIPine (NORVASC) 10 MG tablet Take 1 tablet by mouth daily 30 tablet 5    nabumetone (RELAFEN) 500 MG tablet Take 1 tablet by mouth 2 times daily as needed for Pain 40 tablet 1    Compression Stockings MISC by Does not apply route 1 each 0    simvastatin (ZOCOR) 40 MG tablet Take 1 tablet by mouth nightly 90 tablet 3    finasteride (PROSCAR) 5 MG tablet Take 1 tablet by mouth daily Please print sig in Emirati if available (Emirati speaking only patient) 90 tablet 3    DULoxetine (CYMBALTA) 20 MG extended release capsule TAKE 1 CAPSULE BY MOUTH ONE TIME A DAY  30 capsule 11    tamsulosin (FLOMAX) 0.4 MG capsule Take 2 capsules by mouth nightly Please print sig in Emirati if available (Emirati speaking only patient) 60 capsule 11    furosemide (LASIX) 20 MG tablet Take 1 tablet by mouth 2 times daily Please print sig in Emirati if available (Emirati speaking only patient) 60 tablet 11    nitroGLYCERIN (NITROSTAT) 0.4 MG SL tablet Place 1 tablet under the tongue every 5 minutes as needed for Chest pain 25 tablet 3    aspirin 81 MG chewable tablet Take 1 tablet by mouth daily 30 tablet 3     No current facility-administered medications for this visit. Review of Systems   Constitutional: Negative for activity change, chills, fever and weight loss. HENT: Negative for hearing loss. Eyes: Negative for blurred vision. Respiratory: Negative for cough. Gastrointestinal: Negative for abdominal pain, anorexia, nausea and vomiting. Musculoskeletal: Negative for back pain. Neurological: Positive for headaches. Negative for dizziness, seizures, weakness and numbness. Psychiatric/Behavioral: Negative for behavioral problems and sleep disturbance. The patient is not nervous/anxious.         Lab Results   Component Value Date    WBC 6.0 10/08/2021    HGB 15.5 10/08/2021    HCT 47.3 10/08/2021    MCV 94.4 10/08/2021     10/08/2021     Lab Results   Component Value Date     10/08/2021    K 4.4 10/08/2021     10/08/2021    CO2 25 10/08/2021    BUN 16 10/08/2021    CREATININE 1.1 10/08/2021    GLUCOSE 104 (H) 08/14/2020    CALCIUM 9.4 10/08/2021    PROT 7.1 10/08/2021    LABALBU 4.3 10/08/2021    BILITOT 0.7 10/08/2021    ALKPHOS 81 10/08/2021    AST 16 10/08/2021    ALT 10 10/08/2021    LABGLOM >60 10/08/2021    GFRAA >60 10/08/2021    AGRATIO 1.5 10/08/2021    GLOB 2.8 10/08/2021     Lab Results   Component Value Date    CHOL 193 10/08/2021    CHOL 161 02/08/2019    CHOL 212 (H) 07/16/2018     Lab Results   Component Value Date    TRIG 86 10/08/2021    TRIG 78 02/08/2019    TRIG 83 07/16/2018     Lab Results   Component Value Date    HDL 57 10/08/2021    HDL 62 02/08/2019    HDL 64 07/16/2018     Lab Results   Component Value Date    LDLCALC 119 (H) 10/08/2021    LDLCALC 131 (H) 03/02/2012     Lab Results   Component Value Date    LABA1C 5.9 12/18/2020     Lab Results   Component Value Date    TSH 4.82 (H) 10/08/2021         BP (!) 200/100   Pulse 70   Ht 6' (1.829 m)   Wt 231 lb (104.8 kg)   SpO2 95%   BMI 31.33 kg/m²     BP Readings from Last 3 Encounters:   10/06/21 (!) 200/100   03/23/21 (!) 168/72   12/18/20 138/82       Wt Readings from Last 3 Encounters:   10/06/21 231 lb (104.8 kg)   03/23/21 238 lb 9.6 oz (108.2 kg)   12/18/20 246 lb (111.6 kg)         Physical Exam  Constitutional:       General: He is not in acute distress. Appearance: Normal appearance. He is not ill-appearing or diaphoretic. HENT:      Head: Normocephalic and atraumatic. Eyes:      General: No scleral icterus. Extraocular Movements: Extraocular movements intact. Pupils: Pupils are equal, round, and reactive to light. Cardiovascular:      Rate and Rhythm: Normal rate and regular rhythm. Heart sounds: No murmur heard. Pulmonary:      Effort: Pulmonary effort is normal.      Breath sounds: Normal breath sounds. No wheezing or rhonchi. Musculoskeletal:      Cervical back: Normal range of motion and neck supple. Right lower leg: Edema (1+) present. Left lower leg: Edema (1+) present. Neurological:      General: No focal deficit present. Mental Status: He is alert and oriented to person, place, and time. Cranial Nerves: No cranial nerve deficit. Sensory: No sensory deficit. Psychiatric:         Mood and Affect: Mood normal.         Behavior: Behavior normal.         ASSESSMENT/ PLAN:    1. Essential hypertension  - high, patient dose not know what medication he is taking, so add:  - losartan (COZAAR) 50 MG tablet; Take 1 tablet by mouth daily  Dispense: 90 tablet; Refill: 1  - Comprehensive Metabolic Panel, Fasting; Future  - CBC Auto Differential; Future  - patient refuse to go to the cardiology    2. Mixed hyperlipidemia  - Lipid Panel; Future  - TSH without Reflex; Future  - T4, Free; Future    3. Gastroesophageal reflux disease without esophagitis  - stable    4. Chronic obstructive pulmonary disease, unspecified COPD type (HCC)  - stable    5. Nonintractable headache, unspecified chronicity pattern, unspecified headache type  - could be related to high blood pressure, had CT head before wasnormal      Patient also has mention having urinary problem and ED, refuse to go and see the urology. - All old blood work reviewed with the patient  - Appropriate prescription are addressed. - After visit summery provided. - Questions answered and patient verbalizes understanding.  - Call for any problem, questions, or concerns. Return in about 1 month (around 11/6/2021).

## 2021-10-10 ASSESSMENT — ENCOUNTER SYMPTOMS
SWOLLEN GLANDS: 0
COUGH: 0
ABDOMINAL PAIN: 0
NAUSEA: 0
VOMITING: 0
SCALP TENDERNESS: 1
BACK PAIN: 0
FACIAL SWEATING: 0
BLURRED VISION: 0

## 2021-10-15 DIAGNOSIS — I25.110 CORONARY ARTERY DISEASE INVOLVING NATIVE CORONARY ARTERY OF NATIVE HEART WITH UNSTABLE ANGINA PECTORIS (HCC): ICD-10-CM

## 2021-10-15 RX ORDER — SIMVASTATIN 40 MG
40 TABLET ORAL NIGHTLY
Qty: 90 TABLET | Refills: 3 | Status: SHIPPED | OUTPATIENT
Start: 2021-10-15

## 2021-11-24 ENCOUNTER — OFFICE VISIT (OUTPATIENT)
Dept: FAMILY MEDICINE CLINIC | Age: 86
End: 2021-11-24
Payer: MEDICARE

## 2021-11-24 VITALS
HEART RATE: 84 BPM | SYSTOLIC BLOOD PRESSURE: 145 MMHG | DIASTOLIC BLOOD PRESSURE: 82 MMHG | BODY MASS INDEX: 30.49 KG/M2 | WEIGHT: 224.8 LBS | OXYGEN SATURATION: 98 %

## 2021-11-24 DIAGNOSIS — K21.9 GASTROESOPHAGEAL REFLUX DISEASE WITHOUT ESOPHAGITIS: ICD-10-CM

## 2021-11-24 DIAGNOSIS — I10 ESSENTIAL HYPERTENSION: Primary | ICD-10-CM

## 2021-11-24 DIAGNOSIS — E78.2 MIXED HYPERLIPIDEMIA: ICD-10-CM

## 2021-11-24 DIAGNOSIS — N40.1 BENIGN PROSTATIC HYPERPLASIA WITH LOWER URINARY TRACT SYMPTOMS, SYMPTOM DETAILS UNSPECIFIED: ICD-10-CM

## 2021-11-24 PROCEDURE — 99214 OFFICE O/P EST MOD 30 MIN: CPT | Performed by: FAMILY MEDICINE

## 2021-11-24 RX ORDER — TAMSULOSIN HYDROCHLORIDE 0.4 MG/1
0.8 CAPSULE ORAL NIGHTLY
Qty: 180 CAPSULE | Refills: 3 | Status: SHIPPED | OUTPATIENT
Start: 2021-11-24 | End: 2022-07-25 | Stop reason: SDUPTHER

## 2021-11-24 RX ORDER — FINASTERIDE 5 MG/1
5 TABLET, FILM COATED ORAL DAILY
Qty: 90 TABLET | Refills: 3 | Status: SHIPPED | OUTPATIENT
Start: 2021-11-24 | End: 2022-07-25 | Stop reason: SDUPTHER

## 2021-11-24 NOTE — PROGRESS NOTES
Harini Mayfield  1935 11/27/21    Chief Complaint   Patient presents with    1 Month Follow-Up    Hypertension    Hyperlipidemia    COPD    Gastroesophageal Reflux           Patient here for 1 month f/u regarding  HTN, HLD, COPD, and GERD. Patient still not satisfy with the services. He brought his medications with him. Past Medical History:   Diagnosis Date    Abnormal PFTs (pulmonary function tests)     PFT 10/09 3.21 (98%) / 4.97 (115%)=0.65    BPH     Mod BPH 40 gm prostate- Cystourethroscopy 4/2010- Dr. Evert Munguia    COPD, mild [facility]) 2/28/2018    Coronary artery disease involving native coronary artery of native heart with unstable angina pectoris ([facility] [address]     Depression     Pt has refused Rx in the past    DJD (degenerative joint disease), lumbar     CT scan 3/29/2010    Dyspnea on exertion 10/4/2018    Former smoker 2/28/2018    H/O echocardiogram 05/11/2018    EF 50-60%, Mild LVH, Grade 1 diastolic dysfunction, Sclerotice but non-stenotic AV.  H/O echocardiogram 09/10/2018    Limited- EF 50-55%, AV sclerotic but not stenotic, visualized MR, TR, and TN. Dilated aortic root.  History of nuclear stress test 08/07/2018    cardiolite-normal    Hx of cardiovascular stress test 05/11/2018    EF 52%, Abn stress images, Mild small-size lateral wall perfusion, Mod inferior wall ischemia.     Hypertension 1996    Insomnia     Kidney stones     CT scan 3/29/2010    Liver cyst     CT scan 3/29/2010    Lumbar disc disease 8/2012    Dr. Malou Nunn, will refer to Dr. Alina Pringle for eval    Lumbar stenosis     CT scan 3/29/2010    Mixed hyperlipidemia 3/24/2021    Nocturia     Orchitis and epididymitis, unspecified 8/2012    Dr. Evert Munguia    Pain in left testicle     Pancreatic cyst     CT scan 3/29/2010    Spinal stenosis 8/2012    severe-Dr. Bart Daniel Suicide attempt [facility]) 3/2005    drug overdose with  Zestoretic    Tachycardia     \"saw  , not sure of his name, did tests and did not find anything\"\"that was about 4 months ago\"\"no more episodes since had work up\"    Ulcer     hx of ulcers\"three years ago but ok now\"(pc)     Past Surgical History:   Procedure Laterality Date    CHOLECYSTECTOMY  42 yrs ago    CYSTOSCOPY  2010    ENDOSCOPY, COLON, DIAGNOSTIC      EYE SURGERY      cataract ext carroll    HERNIA REPAIR  over 12 yrs ago    Bilateral inguinal hernia repair    PTCA  [id number]    [facility]    PULMONARY STRESS TEST  3/1/12    Normal; ejection fraction 70%; exercise capacity 4.6 METS    TONSILLECTOMY       History reviewed. No pertinent family history. Social History     Socioeconomic History    Marital status: Legally      Spouse name: Not on file    Number of children: Not on file    Years of education: Not on file    Highest education level: Not on file   Occupational History    Not on file   Tobacco Use    Smoking status: Former Smoker     Packs/day: 2.50     Years: 65.00     Pack years: 162.50     Quit date: 2009     Years since quittin.2    Smokeless tobacco: Never Used    Tobacco comment: Pt smoked for 65 yrs. Quit 9 yrs ago. Updated 18   Substance and Sexual Activity    Alcohol use: No     Alcohol/week: 4.2 standard drinks     Types: 5 Standard drinks or equivalent per week    Drug use: Not on file    Sexual activity: Not on file   Other Topics Concern    Not on file   Social History Narrative    Not on file     Social Determinants of Health     Financial Resource Strain:     Difficulty of Paying Living Expenses: Not on file   Food Insecurity:     Worried About Running Out of Food in the Last Year: Not on file    [place] of Food in the Last Year: Not on file   Transportation Needs:     Lack of Transportation (Medical): Not on file    Lack of Transportation (Non-Medical):  Not on file   Physical Activity:     Days of Exercise per Week: Not on file    Minutes of Exercise per Session: Not on file   Stress:     Feeling of Stress : Not on file Social Connections:     Frequency of Communication with Friends and Family: Not on file    Frequency of Social Gatherings with Friends and Family: Not on file    Attends Roman Catholic Services: Not on file    Active Member of Clubs or Organizations: Not on file    Attends Club or Organization Meetings: Not on file    Marital Status: Not on file   Intimate Partner Violence:     Fear of Current or Ex-Partner: Not on file    Emotionally Abused: Not on file    Physically Abused: Not on file    Sexually Abused: Not on file   Housing Stability:     Unable to Pay for Housing in the Last Year: Not on file    Number of [place] in the Last Year: Not on file    Unstable Housing in the Last Year: Not on file       Allergies   Allergen Reactions    Ace Inhibitors      Angioedema, 8/05/ (on 9/26/12- pt denies any allergic reaction to any medications)     Current Outpatient Medications   Medication Sig Dispense Refill    tamsulosin (FLOMAX) 0.4 MG capsule Take 2 capsules by mouth nightly Please print sig in Lao if available (Lao speaking only patient) 180 capsule 3    finasteride (PROSCAR) 5 MG tablet Take 1 tablet by mouth daily Please print sig in Lao if available (Lao speaking only patient) 90 tablet 3    DULoxetine (CYMBALTA) 20 MG extended release capsule TAKE 1 CAPSULE BY MOUTH ONE TIME A DAY 30 capsule 5    simvastatin (ZOCOR) 40 MG tablet Take 1 tablet by mouth nightly 90 tablet 3    losartan (COZAAR) 50 MG tablet Take 1 tablet by mouth daily 90 tablet 1    omeprazole (PRILOSEC) 40 MG delayed release capsule Take 1 capsule by mouth every morning Please print sig in Lao if available (Lao speaking only patient) 90 capsule 3    carvedilol (COREG) 6.25 MG tablet Take 0.5 tablets by mouth 2 times daily 60 tablet 3    clopidogrel (PLAVIX) 75 MG tablet TAKE 1 TABLET BY MOUTH ONE TIME A DAY 30 tablet 5    amLODIPine (NORVASC) 10 MG tablet Take 1 tablet by mouth daily 30 tablet 5  Compression Stockings MISC by Does not apply route 1 each 0    furosemide (LASIX) 20 MG tablet Take 1 tablet by mouth 2 times daily Please print sig in Ukrainian if available (Ukrainian speaking only patient) 60 tablet 11    nitroGLYCERIN (NITROSTAT) 0.4 MG SL tablet Place 1 tablet under the tongue every 5 minutes as needed for Chest pain 25 tablet 3    aspirin 81 MG chewable tablet Take 1 tablet by mouth daily 30 tablet 3     No current facility-administered medications for this visit. Review of Systems   Constitutional: Negative for activity change, chills and fever. HENT: Negative for hearing loss. Respiratory: Negative for cough. Gastrointestinal: Negative for abdominal pain. Musculoskeletal: Negative for back pain. Neurological: Positive for headaches. Negative for dizziness, seizures and numbness. Psychiatric/Behavioral: Negative for behavioral problems and sleep disturbance. The patient is not nervous/anxious.         Lab Results   Component Value Date    WBC 6.0 10/08/2021    HGB 15.5 10/08/2021    HCT 47.3 10/08/2021    MCV 94.4 10/08/2021     10/08/2021     Lab Results   Component Value Date     10/08/2021    K 4.4 10/08/2021     10/08/2021    CO2 25 10/08/2021    BUN 16 10/08/2021    CREATININE 1.1 10/08/2021    GLUCOSE 104 (H) 08/14/2020    CALCIUM 9.4 10/08/2021    PROT 7.1 10/08/2021    LABALBU 4.3 10/08/2021    BILITOT 0.7 10/08/2021    ALKPHOS 81 10/08/2021    AST 16 10/08/2021    ALT 10 10/08/2021    LABGLOM >60 10/08/2021    GFRAA >60 10/08/2021    AGRATIO 1.5 10/08/2021    GLOB 2.8 10/08/2021     Lab Results   Component Value Date    CHOL 193 10/08/2021    CHOL 161 02/08/2019    CHOL 212 (H) 07/16/2018     Lab Results   Component Value Date    TRIG 86 10/08/2021    TRIG 78 02/08/2019    TRIG 83 07/16/2018     Lab Results   Component Value Date    HDL 57 10/08/2021    HDL 62 02/08/2019    HDL 64 07/16/2018     Lab Results   Component Value Date    LDLCALC 119 (H) 10/08/2021    LDLCALC 131 (H) 03/02/2012     Lab Results   Component Value Date    LABA1C 5.9 12/18/2020     Lab Results   Component Value Date    TSH 4.82 (H) 10/08/2021         BP (!) 145/82   Pulse 84   Wt 224 lb 12.8 oz (102 kg)   SpO2 98%   BMI 30.49 kg/m²     BP Readings from Last 3 Encounters:   11/24/21 (!) 145/82   10/06/21 (!) 200/100   03/23/21 (!) 168/72       Wt Readings from Last 3 Encounters:   11/24/21 224 lb 12.8 oz (102 kg)   10/06/21 231 lb (104.8 kg)   03/23/21 238 lb 9.6 oz (108.2 kg)         Physical Exam  Constitutional:       General: He is not in acute distress. Appearance: Normal appearance. He is not ill-appearing or diaphoretic. HENT:      Head: Normocephalic and atraumatic. Eyes:      General: No scleral icterus. Extraocular Movements: Extraocular movements intact. Pupils: Pupils are equal, round, and reactive to light. Cardiovascular:      Rate and Rhythm: Normal rate and regular rhythm. Heart sounds: No murmur heard. Pulmonary:      Effort: Pulmonary effort is normal.      Breath sounds: Normal breath sounds. No wheezing. Musculoskeletal:      Cervical back: Normal range of motion and neck supple. Right lower leg: Edema (1+) present. Left lower leg: Edema (1+) present. Neurological:      General: No focal deficit present. Mental Status: He is alert and oriented to person, place, and time. Cranial Nerves: No cranial nerve deficit. Sensory: No sensory deficit. Psychiatric:         Mood and Affect: Mood normal.         Behavior: Behavior normal.         ASSESSMENT/ PLAN:    1. Essential hypertension  - better than before    2. Mixed hyperlipidemia  - stable    3. Gastroesophageal reflux disease without esophagitis  - stable    4. Benign prostatic hyperplasia with lower urinary tract symptoms, symptom details unspecified  - tamsulosin (FLOMAX) 0.4 MG capsule;  Take 2 capsules by mouth nightly Please print sig in Thai if available (Guamanian speaking only patient)  Dispense: 180 capsule; Refill: 3  - finasteride (PROSCAR) 5 MG tablet; Take 1 tablet by mouth daily Please print sig in Guamanian if available (Guamanian speaking only patient)  Dispense: 90 tablet; Refill: 3              - All old blood work reviewed with the patient  - Appropriate prescription are addressed. - After visit summery provided. - Questions answered and patient verbalizes understanding.  - Call for any problem, questions, or concerns. Return in about 3 months (around 2/24/2022) for medicare wellness.

## 2021-11-27 ASSESSMENT — ENCOUNTER SYMPTOMS
ABDOMINAL PAIN: 0
COUGH: 0
BACK PAIN: 0

## 2022-07-25 ENCOUNTER — OFFICE VISIT (OUTPATIENT)
Dept: FAMILY MEDICINE CLINIC | Age: 87
End: 2022-07-25
Payer: MEDICARE

## 2022-07-25 VITALS
HEART RATE: 90 BPM | OXYGEN SATURATION: 92 % | DIASTOLIC BLOOD PRESSURE: 95 MMHG | BODY MASS INDEX: 29.16 KG/M2 | SYSTOLIC BLOOD PRESSURE: 180 MMHG | WEIGHT: 215 LBS

## 2022-07-25 DIAGNOSIS — I10 ESSENTIAL HYPERTENSION: ICD-10-CM

## 2022-07-25 DIAGNOSIS — R10.30 LOWER ABDOMINAL PAIN: Primary | ICD-10-CM

## 2022-07-25 DIAGNOSIS — R39.15 URGENCY OF URINATION: ICD-10-CM

## 2022-07-25 DIAGNOSIS — N40.1 BENIGN PROSTATIC HYPERPLASIA WITH LOWER URINARY TRACT SYMPTOMS, SYMPTOM DETAILS UNSPECIFIED: ICD-10-CM

## 2022-07-25 LAB
A/G RATIO: 1.5 (ref 1.1–2.2)
ALBUMIN SERPL-MCNC: 4.4 G/DL (ref 3.4–5)
ALP BLD-CCNC: 84 U/L (ref 40–129)
ALT SERPL-CCNC: 14 U/L (ref 10–40)
ANION GAP SERPL CALCULATED.3IONS-SCNC: 15 MMOL/L (ref 3–16)
AST SERPL-CCNC: 16 U/L (ref 15–37)
BASOPHILS ABSOLUTE: 0 K/UL (ref 0–0.2)
BASOPHILS RELATIVE PERCENT: 0.6 %
BILIRUB SERPL-MCNC: 0.7 MG/DL (ref 0–1)
BUN BLDV-MCNC: 18 MG/DL (ref 7–20)
CALCIUM SERPL-MCNC: 9.5 MG/DL (ref 8.3–10.6)
CHLORIDE BLD-SCNC: 103 MMOL/L (ref 99–110)
CO2: 25 MMOL/L (ref 21–32)
CREAT SERPL-MCNC: 1 MG/DL (ref 0.8–1.3)
EOSINOPHILS ABSOLUTE: 0.1 K/UL (ref 0–0.6)
EOSINOPHILS RELATIVE PERCENT: 1.8 %
GFR AFRICAN AMERICAN: >60
GFR NON-AFRICAN AMERICAN: >60
GLUCOSE FASTING: 92 MG/DL (ref 70–99)
HCT VFR BLD CALC: 48.1 % (ref 40.5–52.5)
HEMOGLOBIN: 16.1 G/DL (ref 13.5–17.5)
LYMPHOCYTES ABSOLUTE: 1.2 K/UL (ref 1–5.1)
LYMPHOCYTES RELATIVE PERCENT: 14.7 %
MCH RBC QN AUTO: 32.2 PG (ref 26–34)
MCHC RBC AUTO-ENTMCNC: 33.4 G/DL (ref 31–36)
MCV RBC AUTO: 96.2 FL (ref 80–100)
MONOCYTES ABSOLUTE: 1.2 K/UL (ref 0–1.3)
MONOCYTES RELATIVE PERCENT: 14.5 %
NEUTROPHILS ABSOLUTE: 5.5 K/UL (ref 1.7–7.7)
NEUTROPHILS RELATIVE PERCENT: 68.4 %
PDW BLD-RTO: 14.4 % (ref 12.4–15.4)
PLATELET # BLD: 224 K/UL (ref 135–450)
PMV BLD AUTO: 9.6 FL (ref 5–10.5)
POTASSIUM SERPL-SCNC: 4.6 MMOL/L (ref 3.5–5.1)
PROSTATE SPECIFIC ANTIGEN: 1.21 NG/ML (ref 0–4)
RBC # BLD: 5 M/UL (ref 4.2–5.9)
SODIUM BLD-SCNC: 143 MMOL/L (ref 136–145)
TOTAL PROTEIN: 7.3 G/DL (ref 6.4–8.2)
WBC # BLD: 8 K/UL (ref 4–11)

## 2022-07-25 PROCEDURE — 1123F ACP DISCUSS/DSCN MKR DOCD: CPT | Performed by: FAMILY MEDICINE

## 2022-07-25 PROCEDURE — 36415 COLL VENOUS BLD VENIPUNCTURE: CPT | Performed by: FAMILY MEDICINE

## 2022-07-25 PROCEDURE — 99214 OFFICE O/P EST MOD 30 MIN: CPT | Performed by: FAMILY MEDICINE

## 2022-07-25 PROCEDURE — 81002 URINALYSIS NONAUTO W/O SCOPE: CPT | Performed by: FAMILY MEDICINE

## 2022-07-25 RX ORDER — FINASTERIDE 5 MG/1
5 TABLET, FILM COATED ORAL DAILY
Qty: 90 TABLET | Refills: 3 | Status: SHIPPED | OUTPATIENT
Start: 2022-07-25

## 2022-07-25 RX ORDER — AMLODIPINE BESYLATE 10 MG/1
10 TABLET ORAL DAILY
Qty: 90 TABLET | Refills: 3 | Status: SHIPPED | OUTPATIENT
Start: 2022-07-25

## 2022-07-25 RX ORDER — TAMSULOSIN HYDROCHLORIDE 0.4 MG/1
0.8 CAPSULE ORAL NIGHTLY
Qty: 180 CAPSULE | Refills: 3 | Status: SHIPPED | OUTPATIENT
Start: 2022-07-25

## 2022-07-25 RX ORDER — LOSARTAN POTASSIUM 50 MG/1
50 TABLET ORAL DAILY
Qty: 90 TABLET | Refills: 3 | Status: SHIPPED | OUTPATIENT
Start: 2022-07-25

## 2022-07-25 SDOH — ECONOMIC STABILITY: FOOD INSECURITY: WITHIN THE PAST 12 MONTHS, YOU WORRIED THAT YOUR FOOD WOULD RUN OUT BEFORE YOU GOT MONEY TO BUY MORE.: PATIENT DECLINED

## 2022-07-25 SDOH — ECONOMIC STABILITY: FOOD INSECURITY: WITHIN THE PAST 12 MONTHS, THE FOOD YOU BOUGHT JUST DIDN'T LAST AND YOU DIDN'T HAVE MONEY TO GET MORE.: PATIENT DECLINED

## 2022-07-25 ASSESSMENT — PATIENT HEALTH QUESTIONNAIRE - PHQ9
SUM OF ALL RESPONSES TO PHQ QUESTIONS 1-9: 12
SUM OF ALL RESPONSES TO PHQ9 QUESTIONS 1 & 2: 3
2. FEELING DOWN, DEPRESSED OR HOPELESS: 3
SUM OF ALL RESPONSES TO PHQ QUESTIONS 1-9: 12
4. FEELING TIRED OR HAVING LITTLE ENERGY: 3
5. POOR APPETITE OR OVEREATING: 1
SUM OF ALL RESPONSES TO PHQ QUESTIONS 1-9: 10
7. TROUBLE CONCENTRATING ON THINGS, SUCH AS READING THE NEWSPAPER OR WATCHING TELEVISION: 0
9. THOUGHTS THAT YOU WOULD BE BETTER OFF DEAD, OR OF HURTING YOURSELF: 2
10. IF YOU CHECKED OFF ANY PROBLEMS, HOW DIFFICULT HAVE THESE PROBLEMS MADE IT FOR YOU TO DO YOUR WORK, TAKE CARE OF THINGS AT HOME, OR GET ALONG WITH OTHER PEOPLE: 2
8. MOVING OR SPEAKING SO SLOWLY THAT OTHER PEOPLE COULD HAVE NOTICED. OR THE OPPOSITE, BEING SO FIGETY OR RESTLESS THAT YOU HAVE BEEN MOVING AROUND A LOT MORE THAN USUAL: 0
6. FEELING BAD ABOUT YOURSELF - OR THAT YOU ARE A FAILURE OR HAVE LET YOURSELF OR YOUR FAMILY DOWN: 0
1. LITTLE INTEREST OR PLEASURE IN DOING THINGS: 0
SUM OF ALL RESPONSES TO PHQ QUESTIONS 1-9: 12
3. TROUBLE FALLING OR STAYING ASLEEP: 3

## 2022-07-25 ASSESSMENT — COLUMBIA-SUICIDE SEVERITY RATING SCALE - C-SSRS
3. HAVE YOU BEEN THINKING ABOUT HOW YOU MIGHT KILL YOURSELF?: YES
5. HAVE YOU STARTED TO WORK OUT OR WORKED OUT THE DETAILS OF HOW TO KILL YOURSELF? DO YOU INTEND TO CARRY OUT THIS PLAN?: NO
6. HAVE YOU EVER DONE ANYTHING, STARTED TO DO ANYTHING, OR PREPARED TO DO ANYTHING TO END YOUR LIFE?: NO
4. HAVE YOU HAD THESE THOUGHTS AND HAD SOME INTENTION OF ACTING ON THEM?: YES
2. HAVE YOU ACTUALLY HAD ANY THOUGHTS OF KILLING YOURSELF?: YES
1. WITHIN THE PAST MONTH, HAVE YOU WISHED YOU WERE DEAD OR WISHED YOU COULD GO TO SLEEP AND NOT WAKE UP?: YES

## 2022-07-25 ASSESSMENT — SOCIAL DETERMINANTS OF HEALTH (SDOH): HOW HARD IS IT FOR YOU TO PAY FOR THE VERY BASICS LIKE FOOD, HOUSING, MEDICAL CARE, AND HEATING?: PATIENT DECLINED

## 2022-07-25 NOTE — PROGRESS NOTES
Adam Vela  1935 08/17/22    Chief Complaint   Patient presents with    Other     Pain in penis            Patient here c/o pain in the groin area going on for 2 wks, no urinary proble, no abdominal pain. No fever, no constipation and no diarrhea, patient stats the pain is bad , no N/V. Patient stopped taking his blood pressure medication and stopped taking his prostate medications. Fpr few months, denies headache or blurry vision. Past Medical History:   Diagnosis Date    Abnormal PFTs (pulmonary function tests)     PFT 10/09 3.21 (98%) / 4.97 (115%)=0.65    BPH     Mod BPH 40 gm prostate- Cystourethroscopy 4/2010- Dr. Shaunna Figueroa    COPD, mild [facility]) 2/28/2018    Coronary artery disease involving native coronary artery of native heart with unstable angina pectoris ([facility] [address]     Depression     Pt has refused Rx in the past    DJD (degenerative joint disease), lumbar     CT scan 3/29/2010    Dyspnea on exertion 10/4/2018    Former smoker 2/28/2018    H/O echocardiogram 05/11/2018    EF 50-60%, Mild LVH, Grade 1 diastolic dysfunction, Sclerotice but non-stenotic AV. H/O echocardiogram 09/10/2018    Limited- EF 50-55%, AV sclerotic but not stenotic, visualized MR, TR, and VT. Dilated aortic root. History of nuclear stress test 08/07/2018    cardiolite-normal    Hx of cardiovascular stress test 05/11/2018    EF 52%, Abn stress images, Mild small-size lateral wall perfusion, Mod inferior wall ischemia.     Hypertension 1996    Insomnia     Kidney stones     CT scan 3/29/2010    Liver cyst     CT scan 3/29/2010    Lumbar disc disease 8/2012    Dr. Amy Wilkins, will refer to Dr. Johnny Stark for eval    Lumbar stenosis     CT scan 3/29/2010    Mixed hyperlipidemia 3/24/2021    Nocturia     Orchitis and epididymitis, unspecified 8/2012    Dr. Shaunna Figueroa    Pain in left testicle     Pancreatic cyst     CT scan 3/29/2010    Spinal stenosis 8/2012    severe-Dr. Shaunna Figueroa    Suicide attempt [facility]) 3/2005    drug overdose with Zestoretic    Tachycardia     \"saw  , not sure of his name, did tests and did not find anything\"\"that was about 4 months ago\"\"no more episodes since had work up\"    Ulcer     hx of ulcers\"three years ago but ok now\"(pc)     Past Surgical History:   Procedure Laterality Date    CHOLECYSTECTOMY  42 yrs ago    CYSTOSCOPY  2010    ENDOSCOPY, COLON, DIAGNOSTIC      EYE SURGERY      cataract ext carroll    HERNIA REPAIR  over 12 yrs ago    Bilateral inguinal hernia repair    PTCA      [facility]    PULMONARY STRESS TEST  3/1/12    Normal; ejection fraction 70%; exercise capacity 4.6 METS    TONSILLECTOMY       No family history on file. Social History     Socioeconomic History    Marital status: Legally      Spouse name: Not on file    Number of children: Not on file    Years of education: Not on file    Highest education level: Not on file   Occupational History    Not on file   Tobacco Use    Smoking status: Former     Packs/day: 2.50     Years: 65.00     Pack years: 162.50     Types: Cigarettes     Quit date: 2009     Years since quittin.9    Smokeless tobacco: Never    Tobacco comments:     Pt smoked for 65 yrs. Quit 9 yrs ago.  Updated 18   Substance and Sexual Activity    Alcohol use: No     Alcohol/week: 4.2 standard drinks     Types: 5 Standard drinks or equivalent per week    Drug use: Not on file    Sexual activity: Not on file   Other Topics Concern    Not on file   Social History Narrative    Not on file     Social Determinants of Health     Financial Resource Strain: Unknown    Difficulty of Paying Living Expenses: Patient refused   Food Insecurity: Unknown    Worried About Running Out of Food in the Last Year: Patient refused    Ran Out of Food in the Last Year: Patient refused   Transportation Needs: Not on file   Physical Activity: Not on file   Stress: Not on file   Social Connections: Not on file   Intimate Partner Violence: Not on file   Housing Stability: Not on file Allergies   Allergen Reactions    Ace Inhibitors      Angioedema, 8/05/ (on 9/26/12- pt denies any allergic reaction to any medications)     Current Outpatient Medications   Medication Sig Dispense Refill    losartan (COZAAR) 50 MG tablet Take 1 tablet by mouth in the morning. 90 tablet 3    amLODIPine (NORVASC) 10 MG tablet Take 1 tablet by mouth in the morning. 90 tablet 3    tamsulosin (FLOMAX) 0.4 MG capsule Take 2 capsules by mouth nightly Please print sig in Citizen of [place] if available (Citizen of [place] speaking only patient) 180 capsule 3    finasteride (PROSCAR) 5 MG tablet Take 1 tablet by mouth in the morning. Please print sig in Citizen [place] if available (Citizen of [place] speaking only patient). 90 tablet 3    DULoxetine (CYMBALTA) 20 MG extended release capsule TAKE 1 CAPSULE BY MOUTH ONE TIME A DAY 30 capsule 5    simvastatin (ZOCOR) 40 MG tablet Take 1 tablet by mouth nightly 90 tablet 3    omeprazole (PRILOSEC) 40 MG delayed release capsule Take 1 capsule by mouth every morning Please print sig in Citizen [place] if available (Citizen of [place] speaking only patient) 90 capsule 3    carvedilol (COREG) 6.25 MG tablet Take 0.5 tablets by mouth 2 times daily 60 tablet 3    clopidogrel (PLAVIX) 75 MG tablet TAKE 1 TABLET BY MOUTH ONE TIME A DAY 30 tablet 5    Compression Stockings MISC by Does not apply route 1 each 0    furosemide (LASIX) 20 MG tablet Take 1 tablet by mouth 2 times daily Please print sig in Citizen [place] if available (Citizen of [place] speaking only patient) 60 tablet 11    nitroGLYCERIN (NITROSTAT) 0.4 MG SL tablet Place 1 tablet under the tongue every 5 minutes as needed for Chest pain 25 tablet 3    aspirin 81 MG chewable tablet Take 1 tablet by mouth daily 30 tablet 3     No current facility-administered medications for this visit.      Facility-Administered Medications Ordered in Other Visits   Medication Dose Route Frequency Provider Last Rate Last Admin    sodium chloride flush 0.9 % injection 10 mL  10 mL IntraVENous PRN Argenis Hernandez MD   10 mL at 08/17/22 1013       Review of Systems   Constitutional:  Negative for activity change, chills and fever. HENT:  Negative for congestion. Respiratory:  Negative for cough and shortness of breath. Gastrointestinal:  Negative for abdominal distention, abdominal pain, blood in stool, constipation, diarrhea and nausea. Genitourinary:  Positive for genital sores and penile pain. Negative for difficulty urinating, dysuria, flank pain, penile discharge and urgency. Musculoskeletal:  Negative for back pain and gait problem. Neurological:  Negative for dizziness and headaches. Psychiatric/Behavioral:  Positive for agitation. The patient is nervous/anxious.       Lab Results   Component Value Date    WBC 8.0 07/25/2022    HGB 16.1 07/25/2022    HCT 48.1 07/25/2022    MCV 96.2 07/25/2022     07/25/2022     Lab Results   Component Value Date     07/25/2022    K 4.6 07/25/2022     07/25/2022    CO2 25 07/25/2022    BUN 18 07/25/2022    CREATININE 1.0 07/25/2022    GLUCOSE 104 (H) 08/14/2020    CALCIUM 9.5 07/25/2022    PROT 7.3 07/25/2022    LABALBU 4.4 07/25/2022    BILITOT 0.7 07/25/2022    ALKPHOS 84 07/25/2022    AST 16 07/25/2022    ALT 14 07/25/2022    LABGLOM >60 07/25/2022    GFRAA >60 07/25/2022    AGRATIO 1.5 07/25/2022    GLOB 2.8 10/08/2021     Lab Results   Component Value Date    CHOL 193 10/08/2021    CHOL 161 02/08/2019    CHOL 212 (H) 07/16/2018     Lab Results   Component Value Date    TRIG 86 10/08/2021    TRIG 78 02/08/2019    TRIG 83 07/16/2018     Lab Results   Component Value Date    HDL 57 10/08/2021    HDL 62 02/08/2019    HDL 64 07/16/2018     Lab Results   Component Value Date    LDLCALC 119 (H) 10/08/2021    LDLCALC 131 (H) 03/02/2012     Lab Results   Component Value Date    LABA1C 5.9 12/18/2020     Lab Results   Component Value Date    TSH 4.82 (H) 10/08/2021         BP (!) 180/95   Pulse 90   Wt 215 lb (97.5 kg)   SpO2 92%   BMI 29.16 kg/m²     BP Readings from Last 3 Encounters:   07/25/22 (!) 180/95   11/24/21 (!) 145/82   10/06/21 (!) 200/100       Wt Readings from Last 3 Encounters:   08/17/22 213 lb (96.6 kg)   07/25/22 215 lb (97.5 kg)   11/24/21 224 lb 12.8 oz (102 kg)         Physical Exam  Constitutional:       Appearance: Normal appearance. He is not ill-appearing. Cardiovascular:      Rate and Rhythm: Normal rate and regular rhythm. Heart sounds: Normal heart sounds. No murmur heard. Pulmonary:      Effort: Pulmonary effort is normal.      Breath sounds: Normal breath sounds. No wheezing. Abdominal:      General: There is no distension. Palpations: There is no mass. Tenderness: There is no abdominal tenderness. Genitourinary:     Penis: Normal.       Testes: Normal.      Comments: No rash and no lesion  Musculoskeletal:      Cervical back: Normal range of motion. No rigidity. Skin:     Coloration: Skin is not pale. Neurological:      Mental Status: He is alert and oriented to person, place, and time. Psychiatric:         Mood and Affect: Mood is anxious. Affect is angry. Affect is not tearful. Speech: Speech normal.         Behavior: Behavior normal.         Cognition and Memory: Cognition normal.       ASSESSMENT/ PLAN:    1. Lower abdominal pain  - the u/a was negative, will do CT and blood work  - CT ABDOMEN PELVIS W IV CONTRAST Additional Contrast? Oral; Future  - Comprehensive Metabolic Panel, Fasting  - CBC with Auto Differential  - POCT Urinalysis no Micro    2. Essential hypertension  - high today, patient is non compliance, needs to go back on his medication, so will restart the losartan and the norvasc  - losartan (COZAAR) 50 MG tablet; Take 1 tablet by mouth in the morning. Dispense: 90 tablet; Refill: 3  - amLODIPine (NORVASC) 10 MG tablet; Take 1 tablet by mouth in the morning. Dispense: 90 tablet; Refill: 3    3. Urgency of urination  - PSA, Prostatic Specific Antigen    4.  Benign prostatic hyperplasia with lower urinary tract symptoms, symptom details unspecified  - will restart:  - tamsulosin (FLOMAX) 0.4 MG capsule; Take 2 capsules by mouth nightly Please print sig in [place] if available ([place] speaking only patient)  Dispense: 180 capsule; Refill: 3  - finasteride (PROSCAR) 5 MG tablet; Take 1 tablet by mouth in the morning. Please print sig in [place] if available ([place] speaking only patient). Dispense: 90 tablet; Refill: 3            - All old blood work reviewed with the patient  - Appropriate prescription are addressed. - After visit summery provided. - Questions answered and patient verbalizes understanding.  - Call for any problem, questions, or concerns.  - RTC if symptoms worse. Return in about 1 month (around 8/25/2022).

## 2022-07-29 ENCOUNTER — TELEPHONE (OUTPATIENT)
Dept: FAMILY MEDICINE CLINIC | Age: 87
End: 2022-07-29

## 2022-07-29 ENCOUNTER — HOSPITAL ENCOUNTER (OUTPATIENT)
Dept: CT IMAGING | Age: 87
Discharge: HOME | End: 2022-07-29

## 2022-07-29 DIAGNOSIS — R10.30 LOWER ABDOMINAL PAIN: ICD-10-CM

## 2022-07-29 NOTE — TELEPHONE ENCOUNTER
Patient came into [address] [place] [address] for the CT that was ordered. The imaging department called here and they state that after they used an  to explain the test, patient became very upset when learning what the testing entailed. He was rude per imaging staff and left without completing the imaging. They are documenting the encounter on their end but wanted PCP advised.

## 2022-07-29 NOTE — PROGRESS NOTES
Pt refused CT A/P with IV/Oral contrast after explaining exam via an  #[id number]. Pt upset that test was not explained by provider. He refused exam and walked out. Notified Kanwal Baeza from the office.

## 2022-08-17 ENCOUNTER — HOSPITAL ENCOUNTER (OUTPATIENT)
Dept: CT IMAGING | Age: 87
Discharge: HOME | End: 2022-08-17
Payer: MEDICARE

## 2022-08-17 ENCOUNTER — TELEPHONE (OUTPATIENT)
Dept: FAMILY MEDICINE CLINIC | Age: 87
End: 2022-08-17

## 2022-08-17 ENCOUNTER — OFFICE VISIT (OUTPATIENT)
Dept: FAMILY MEDICINE CLINIC | Age: 87
End: 2022-08-17
Payer: MEDICARE

## 2022-08-17 DIAGNOSIS — R10.30 LOWER ABDOMINAL PAIN: Primary | ICD-10-CM

## 2022-08-17 DIAGNOSIS — R10.30 LOWER ABDOMINAL PAIN: ICD-10-CM

## 2022-08-17 DIAGNOSIS — I10 ESSENTIAL HYPERTENSION: ICD-10-CM

## 2022-08-17 PROCEDURE — 6360000004 HC RX CONTRAST MEDICATION: Performed by: FAMILY MEDICINE

## 2022-08-17 PROCEDURE — 74177 CT ABD & PELVIS W/CONTRAST: CPT

## 2022-08-17 PROCEDURE — 99214 OFFICE O/P EST MOD 30 MIN: CPT | Performed by: FAMILY MEDICINE

## 2022-08-17 PROCEDURE — 1123F ACP DISCUSS/DSCN MKR DOCD: CPT | Performed by: FAMILY MEDICINE

## 2022-08-17 PROCEDURE — 2580000003 HC RX 258: Performed by: FAMILY MEDICINE

## 2022-08-17 RX ORDER — SODIUM CHLORIDE 0.9 % (FLUSH) 0.9 %
10 SYRINGE (ML) INJECTION PRN
Status: DISCONTINUED | OUTPATIENT
Start: 2022-08-17 | End: 2022-08-18 | Stop reason: HOSPADM

## 2022-08-17 RX ADMIN — SODIUM CHLORIDE, PRESERVATIVE FREE 10 ML: 5 INJECTION INTRAVENOUS at 10:13

## 2022-08-17 RX ADMIN — IOPAMIDOL 75 ML: 755 INJECTION, SOLUTION INTRAVENOUS at 10:15

## 2022-08-17 ASSESSMENT — ENCOUNTER SYMPTOMS
SHORTNESS OF BREATH: 0
BLOOD IN STOOL: 0
COUGH: 0
ABDOMINAL DISTENTION: 0
DIARRHEA: 0
ABDOMINAL PAIN: 0
BACK PAIN: 0
CONSTIPATION: 0
NAUSEA: 0

## 2022-08-17 NOTE — PROGRESS NOTES
Adam Vela  1935 08/21/22    Chief Complaint   Patient presents with    Groin Pain     X 1 wk             Patient here still c/o lower abdominal pain, groin pain, going on for few wk, no dysuria, and started having constipation, but no diarrhea. No fever, patient seen before for the same reason, CT ordered and patient refuse to do the CT because refuse to drink the contrast, had blood work came back fine      Past Medical History:   Diagnosis Date    Abnormal PFTs (pulmonary function tests)     PFT 10/09 3.21 (98%) / 4.97 (115%)=0.65    BPH     Mod BPH 40 gm prostate- Cystourethroscopy 4/2010- Dr. Shaunna Figueroa    COPD, mild [facility]) 2/28/2018    Coronary artery disease involving native coronary artery of native heart with unstable angina pectoris ([facility] [address]     Depression     Pt has refused Rx in the past    DJD (degenerative joint disease), lumbar     CT scan 3/29/2010    Dyspnea on exertion 10/4/2018    Former smoker 2/28/2018    H/O echocardiogram 05/11/2018    EF 50-60%, Mild LVH, Grade 1 diastolic dysfunction, Sclerotice but non-stenotic AV. H/O echocardiogram 09/10/2018    Limited- EF 50-55%, AV sclerotic but not stenotic, visualized MR, TR, and MD. Dilated aortic root. History of nuclear stress test 08/07/2018    cardiolite-normal    Hx of cardiovascular stress test 05/11/2018    EF 52%, Abn stress images, Mild small-size lateral wall perfusion, Mod inferior wall ischemia.     Hypertension 1996    Insomnia     Kidney stones     CT scan 3/29/2010    Liver cyst     CT scan 3/29/2010    Lumbar disc disease 8/2012    Dr. Amy Wilkins, will refer to Dr. Johnny Stark for eval    Lumbar stenosis     CT scan 3/29/2010    Mixed hyperlipidemia 3/24/2021    Nocturia     Orchitis and epididymitis, unspecified 8/2012    Dr. Shaunna Figueroa    Pain in left testicle     Pancreatic cyst     CT scan 3/29/2010    Spinal stenosis 8/2012    severe-Dr. Shaunna Figueroa    Suicide attempt [facility]) 3/2005    drug overdose with  Zestoretic    Tachycardia \"saw  , not sure of his name, did tests and did not find anything\"\"that was about 4 months ago\"\"no more episodes since had work up\"    Ulcer     hx of ulcers\"three years ago but ok now\"(pc)     Past Surgical History:   Procedure Laterality Date    CHOLECYSTECTOMY  42 yrs ago    CYSTOSCOPY  2010    ENDOSCOPY, COLON, DIAGNOSTIC      EYE SURGERY      cataract ext carroll    HERNIA REPAIR  over 12 yrs ago    Bilateral inguinal hernia repair    PTCA  [id number]    [facility]    PULMONARY STRESS TEST  3/1/12    Normal; ejection fraction 70%; exercise capacity 4.6 METS    TONSILLECTOMY       No family history on file. Social History     Socioeconomic History    Marital status: Legally      Spouse name: Not on file    Number of children: Not on file    Years of education: Not on file    Highest education level: Not on file   Occupational History    Not on file   Tobacco Use    Smoking status: Former     Packs/day: 2.50     Years: 65.00     Pack years: 162.50     Types: Cigarettes     Quit date: 2009     Years since quittin.0    Smokeless tobacco: Never    Tobacco comments:     Pt smoked for 65 yrs. Quit 9 yrs ago.  Updated 18   Substance and Sexual Activity    Alcohol use: No     Alcohol/week: 4.2 standard drinks     Types: 5 Standard drinks or equivalent per week    Drug use: Not on file    Sexual activity: Not on file   Other Topics Concern    Not on file   Social History Narrative    Not on file     Social Determinants of Health     Financial Resource Strain: Unknown    Difficulty of Paying Living Expenses: Patient refused   Food Insecurity: Unknown    Worried About Running Out of Food in the Last Year: Patient refused    Ran Out of Food in the Last Year: Patient refused   Transportation Needs: Not on file   Physical Activity: Not on file   Stress: Not on file   Social Connections: Not on file   Intimate Partner Violence: Not on file   Housing Stability: Not on file       Allergies   Allergen Reactions Ace Inhibitors      Angioedema, 8/05/ (on 9/26/12- pt denies any allergic reaction to any medications)     Current Outpatient Medications   Medication Sig Dispense Refill    losartan (COZAAR) 50 MG tablet Take 1 tablet by mouth in the morning. 90 tablet 3    amLODIPine (NORVASC) 10 MG tablet Take 1 tablet by mouth in the morning. 90 tablet 3    tamsulosin (FLOMAX) 0.4 MG capsule Take 2 capsules by mouth nightly Please print sig in [place] if available ([place] speaking only patient) 180 capsule 3    finasteride (PROSCAR) 5 MG tablet Take 1 tablet by mouth in the morning. Please print sig in [place] if available ([place] speaking only patient). 90 tablet 3    DULoxetine (CYMBALTA) 20 MG extended release capsule TAKE 1 CAPSULE BY MOUTH ONE TIME A DAY 30 capsule 5    simvastatin (ZOCOR) 40 MG tablet Take 1 tablet by mouth nightly 90 tablet 3    omeprazole (PRILOSEC) 40 MG delayed release capsule Take 1 capsule by mouth every morning Please print sig in [place] if available ([place] speaking only patient) 90 capsule 3    carvedilol (COREG) 6.25 MG tablet Take 0.5 tablets by mouth 2 times daily 60 tablet 3    clopidogrel (PLAVIX) 75 MG tablet TAKE 1 TABLET BY MOUTH ONE TIME A DAY 30 tablet 5    Compression Stockings MISC by Does not apply route 1 each 0    furosemide (LASIX) 20 MG tablet Take 1 tablet by mouth 2 times daily Please print sig in [place] if available ([place] speaking only patient) 60 tablet 11    nitroGLYCERIN (NITROSTAT) 0.4 MG SL tablet Place 1 tablet under the tongue every 5 minutes as needed for Chest pain 25 tablet 3    aspirin 81 MG chewable tablet Take 1 tablet by mouth daily 30 tablet 3     No current facility-administered medications for this visit. Review of Systems   Constitutional:  Negative for activity change, chills and fever. HENT:  Negative for congestion. Respiratory:  Negative for cough and shortness of breath.     Gastrointestinal:  Positive for abdominal pain and constipation. Negative for abdominal distention, blood in stool, diarrhea, nausea and vomiting. Genitourinary:  Positive for genital sores and penile pain. Negative for difficulty urinating, dysuria, flank pain, penile discharge and urgency. Musculoskeletal:  Negative for back pain. Neurological:  Negative for dizziness and headaches. Psychiatric/Behavioral:  Negative for agitation. The patient is not nervous/anxious. Lab Results   Component Value Date    WBC 8.0 07/25/2022    HGB 16.1 07/25/2022    HCT 48.1 07/25/2022    MCV 96.2 07/25/2022     07/25/2022     Lab Results   Component Value Date     07/25/2022    K 4.6 07/25/2022     07/25/2022    CO2 25 07/25/2022    BUN 18 07/25/2022    CREATININE 1.0 07/25/2022    GLUCOSE 104 (H) 08/14/2020    CALCIUM 9.5 07/25/2022    PROT 7.3 07/25/2022    LABALBU 4.4 07/25/2022    BILITOT 0.7 07/25/2022    ALKPHOS 84 07/25/2022    AST 16 07/25/2022    ALT 14 07/25/2022    LABGLOM >60 07/25/2022    GFRAA >60 07/25/2022    AGRATIO 1.5 07/25/2022    GLOB 2.8 10/08/2021     Lab Results   Component Value Date    CHOL 193 10/08/2021    CHOL 161 02/08/2019    CHOL 212 (H) 07/16/2018     Lab Results   Component Value Date    TRIG 86 10/08/2021    TRIG 78 02/08/2019    TRIG 83 07/16/2018     Lab Results   Component Value Date    HDL 57 10/08/2021    HDL 62 02/08/2019    HDL 64 07/16/2018     Lab Results   Component Value Date    LDLCALC 119 (H) 10/08/2021    LDLCALC 131 (H) 03/02/2012     Lab Results   Component Value Date    LABA1C 5.9 12/18/2020     Lab Results   Component Value Date    TSH 4.82 (H) 10/08/2021         BP (!) 160/95   Ht 6' (1.829 m)   Wt 213 lb (96.6 kg)   BMI 28.89 kg/m²               Physical Exam  Constitutional:       Appearance: Normal appearance. He is not ill-appearing. Cardiovascular:      Rate and Rhythm: Normal rate and regular rhythm. Heart sounds: Normal heart sounds. No murmur heard.   Pulmonary:      Effort: Pulmonary effort is normal.      Breath sounds: Normal breath sounds. No wheezing. Abdominal:      General: There is no distension. Palpations: There is no mass. Tenderness: There is no abdominal tenderness. Genitourinary:     Penis: Normal.       Testes: Normal.      Comments: Was examined last visit, No rash and no lesion  Musculoskeletal:      Cervical back: Normal range of motion. No rigidity. Skin:     Coloration: Skin is not pale. Neurological:      Mental Status: He is alert and oriented to person, place, and time. Psychiatric:         Mood and Affect: Mood is anxious. Affect is angry. Affect is not tearful. Speech: Speech normal.         Behavior: Behavior normal.         Cognition and Memory: Cognition normal.       ASSESSMENT/ PLAN:    1. Lower abdominal pain  - will do the CT without oral contrast  - CT ABDOMEN PELVIS W IV CONTRAST Additional Contrast? None; Future    2. Essential hypertension  - elevated , change it last time, we did increase his losartan to 50 mg , and he is on norvasc        - we did use the ipad     - All old blood work reviewed with the patient  - Appropriate prescription are addressed. - After visit babar provided. - Questions answered and patient verbalizes understanding.  - Call for any problem, questions, or concerns.  - RTC if symptoms worse. Return in about 1 week (around 8/24/2022).

## 2022-08-18 ENCOUNTER — OFFICE VISIT (OUTPATIENT)
Dept: FAMILY MEDICINE CLINIC | Age: 87
End: 2022-08-18
Payer: MEDICARE

## 2022-08-18 VITALS
SYSTOLIC BLOOD PRESSURE: 160 MMHG | BODY MASS INDEX: 28.85 KG/M2 | HEIGHT: 72 IN | WEIGHT: 213 LBS | DIASTOLIC BLOOD PRESSURE: 95 MMHG

## 2022-08-18 VITALS
HEART RATE: 85 BPM | WEIGHT: 215 LBS | OXYGEN SATURATION: 96 % | SYSTOLIC BLOOD PRESSURE: 125 MMHG | DIASTOLIC BLOOD PRESSURE: 79 MMHG | BODY MASS INDEX: 29.16 KG/M2

## 2022-08-18 DIAGNOSIS — N28.9 KIDNEY LESION: Primary | ICD-10-CM

## 2022-08-18 DIAGNOSIS — N40.0 PROSTATE ENLARGEMENT: ICD-10-CM

## 2022-08-18 PROCEDURE — 99214 OFFICE O/P EST MOD 30 MIN: CPT | Performed by: FAMILY MEDICINE

## 2022-08-18 PROCEDURE — 1123F ACP DISCUSS/DSCN MKR DOCD: CPT | Performed by: FAMILY MEDICINE

## 2022-08-18 NOTE — PROGRESS NOTES
Hany Prasad  1935 08/20/22    Chief Complaint   Patient presents with    Results           Patient here to discuss the results of the CT abdomin, patient still has the abdominal pain. Past Medical History:   Diagnosis Date    Abnormal PFTs (pulmonary function tests)     PFT 10/09 3.21 (98%) / 4.97 (115%)=0.65    BPH     Mod BPH 40 gm prostate- Cystourethroscopy 4/2010- Dr. Ruddy Magana    COPD, mild [facility]) 2/28/2018    Coronary artery disease involving native coronary artery of native heart with unstable angina pectoris ([facility] [address]     Depression     Pt has refused Rx in the past    DJD (degenerative joint disease), lumbar     CT scan 3/29/2010    Dyspnea on exertion 10/4/2018    Former smoker 2/28/2018    H/O echocardiogram 05/11/2018    EF 50-60%, Mild LVH, Grade 1 diastolic dysfunction, Sclerotice but non-stenotic AV. H/O echocardiogram 09/10/2018    Limited- EF 50-55%, AV sclerotic but not stenotic, visualized MR, TR, and CO. Dilated aortic root. History of nuclear stress test 08/07/2018    cardiolite-normal    Hx of cardiovascular stress test 05/11/2018    EF 52%, Abn stress images, Mild small-size lateral wall perfusion, Mod inferior wall ischemia.     Hypertension 1996    Insomnia     Kidney stones     CT scan 3/29/2010    Liver cyst     CT scan 3/29/2010    Lumbar disc disease 8/2012    Dr. Rob Elkins, will refer to Dr. Drea Weeks for eval    Lumbar stenosis     CT scan 3/29/2010    Mixed hyperlipidemia 3/24/2021    Nocturia     Orchitis and epididymitis, unspecified 8/2012    Dr. Ruddy Magana    Pain in left testicle     Pancreatic cyst     CT scan 3/29/2010    Spinal stenosis 8/2012    severe-Dr. Ruddy Magana    Suicide attempt [facility]) 3/2005    drug overdose with  Zestoretic    Tachycardia     \"saw  , not sure of his name, did tests and did not find anything\"\"that was about 4 months ago\"\"no more episodes since had work up\"    Ulcer     hx of ulcers\"three years ago but ok now\"(pc)     Past Surgical History: Procedure Laterality Date    CHOLECYSTECTOMY  42 yrs ago    CYSTOSCOPY  2010    ENDOSCOPY, COLON, DIAGNOSTIC      EYE SURGERY      cataract ext carroll    HERNIA REPAIR  over 12 yrs ago    Bilateral inguinal hernia repair    PTCA  [id number]    [facility]    PULMONARY STRESS TEST  3/1/12    Normal; ejection fraction 70%; exercise capacity 4.6 METS    TONSILLECTOMY       No family history on file. Social History     Socioeconomic History    Marital status: Legally      Spouse name: Not on file    Number of children: Not on file    Years of education: Not on file    Highest education level: Not on file   Occupational History    Not on file   Tobacco Use    Smoking status: Former     Packs/day: 2.50     Years: 65.00     Pack years: 162.50     Types: Cigarettes     Quit date: 2009     Years since quittin.0    Smokeless tobacco: Never    Tobacco comments:     Pt smoked for 65 yrs. Quit 9 yrs ago.  Updated 18   Substance and Sexual Activity    Alcohol use: No     Alcohol/week: 4.2 standard drinks     Types: 5 Standard drinks or equivalent per week    Drug use: Not on file    Sexual activity: Not on file   Other Topics Concern    Not on file   Social History Narrative    Not on file     Social Determinants of Health     Financial Resource Strain: Unknown    Difficulty of Paying Living Expenses: Patient refused   Food Insecurity: Unknown    Worried About Running Out of Food in the Last Year: Patient refused    Ran Out of Food in the Last Year: Patient refused   Transportation Needs: Not on file   Physical Activity: Not on file   Stress: Not on file   Social Connections: Not on file   Intimate Partner Violence: Not on file   Housing Stability: Not on file       Allergies   Allergen Reactions    Ace Inhibitors      Angioedema,  (on 12- pt denies any allergic reaction to any medications)     Current Outpatient Medications   Medication Sig Dispense Refill    losartan (COZAAR) 50 MG tablet Take 1 tablet by mouth in the morning. 90 tablet 3    amLODIPine (NORVASC) 10 MG tablet Take 1 tablet by mouth in the morning. 90 tablet 3    tamsulosin (FLOMAX) 0.4 MG capsule Take 2 capsules by mouth nightly Please print sig in Nauruan if available (Nauruan speaking only patient) 180 capsule 3    finasteride (PROSCAR) 5 MG tablet Take 1 tablet by mouth in the morning. Please print sig in Nauruan if available (Nauruan speaking only patient). 90 tablet 3    DULoxetine (CYMBALTA) 20 MG extended release capsule TAKE 1 CAPSULE BY MOUTH ONE TIME A DAY 30 capsule 5    simvastatin (ZOCOR) 40 MG tablet Take 1 tablet by mouth nightly 90 tablet 3    omeprazole (PRILOSEC) 40 MG delayed release capsule Take 1 capsule by mouth every morning Please print sig in Nauruan if available (Nauruan speaking only patient) 90 capsule 3    carvedilol (COREG) 6.25 MG tablet Take 0.5 tablets by mouth 2 times daily 60 tablet 3    clopidogrel (PLAVIX) 75 MG tablet TAKE 1 TABLET BY MOUTH ONE TIME A DAY 30 tablet 5    Compression Stockings MISC by Does not apply route 1 each 0    furosemide (LASIX) 20 MG tablet Take 1 tablet by mouth 2 times daily Please print sig in Nauruan if available (Nauruan speaking only patient) 60 tablet 11    nitroGLYCERIN (NITROSTAT) 0.4 MG SL tablet Place 1 tablet under the tongue every 5 minutes as needed for Chest pain 25 tablet 3    aspirin 81 MG chewable tablet Take 1 tablet by mouth daily 30 tablet 3     No current facility-administered medications for this visit. Review of Systems   Constitutional:  Negative for activity change, chills and fever. Respiratory:  Negative for cough and shortness of breath. Cardiovascular:  Negative for chest pain and leg swelling. Gastrointestinal:  Positive for constipation. Negative for abdominal pain, nausea and vomiting. Genitourinary:  Negative for dysuria, flank pain and frequency. Musculoskeletal:  Negative for back pain.    Psychiatric/Behavioral:  Negative for agitation and sleep disturbance. The patient is not nervous/anxious. Lab Results   Component Value Date    WBC 8.0 07/25/2022    HGB 16.1 07/25/2022    HCT 48.1 07/25/2022    MCV 96.2 07/25/2022     07/25/2022     Lab Results   Component Value Date     07/25/2022    K 4.6 07/25/2022     07/25/2022    CO2 25 07/25/2022    BUN 18 07/25/2022    CREATININE 1.0 07/25/2022    GLUCOSE 104 (H) 08/14/2020    CALCIUM 9.5 07/25/2022    PROT 7.3 07/25/2022    LABALBU 4.4 07/25/2022    BILITOT 0.7 07/25/2022    ALKPHOS 84 07/25/2022    AST 16 07/25/2022    ALT 14 07/25/2022    LABGLOM >60 07/25/2022    GFRAA >60 07/25/2022    AGRATIO 1.5 07/25/2022    GLOB 2.8 10/08/2021     Lab Results   Component Value Date    CHOL 193 10/08/2021    CHOL 161 02/08/2019    CHOL 212 (H) 07/16/2018     Lab Results   Component Value Date    TRIG 86 10/08/2021    TRIG 78 02/08/2019    TRIG 83 07/16/2018     Lab Results   Component Value Date    HDL 57 10/08/2021    HDL 62 02/08/2019    HDL 64 07/16/2018     Lab Results   Component Value Date    LDLCALC 119 (H) 10/08/2021    LDLCALC 131 (H) 03/02/2012     Lab Results   Component Value Date    LABA1C 5.9 12/18/2020     Lab Results   Component Value Date    TSH 4.82 (H) 10/08/2021         /79 (Site: Left Upper Arm, Position: Sitting, Cuff Size: Large Adult)   Pulse 85   Wt 215 lb (97.5 kg)   SpO2 96%   BMI 29.16 kg/m²     BP Readings from Last 3 Encounters:   08/18/22 125/79   08/17/22 (!) 160/95   07/25/22 (!) 180/95       Wt Readings from Last 3 Encounters:   08/18/22 215 lb (97.5 kg)   08/17/22 213 lb (96.6 kg)   07/25/22 215 lb (97.5 kg)         Physical Exam  Constitutional:       General: He is not in acute distress. Appearance: Normal appearance. He is not ill-appearing or diaphoretic. HENT:      Head: Normocephalic and atraumatic. Eyes:      General: No scleral icterus. Extraocular Movements: Extraocular movements intact.       Pupils: Pupils are equal, round, and reactive to light. Cardiovascular:      Rate and Rhythm: Normal rate and regular rhythm. Heart sounds: No murmur heard. Pulmonary:      Effort: Pulmonary effort is normal.      Breath sounds: Normal breath sounds. No wheezing. Musculoskeletal:      Cervical back: Normal range of motion and neck supple. Right lower leg: Edema (1+) present. Left lower leg: Edema (1+) present. Neurological:      General: No focal deficit present. Mental Status: He is alert and oriented to person, place, and time. Cranial Nerves: No cranial nerve deficit. Sensory: No sensory deficit. Psychiatric:         Mood and Affect: Mood normal.         Behavior: Behavior normal.       ASSESSMENT/ PLAN:    1. Kidney lesion  Ct abdomin and pelvis:    Multiple cysts noted in the liver. Bilateral renal cysts and small renal calculi. 2.2 cm soft tissue density   lesion of the right kidney. This could represent a renal tumor or   proteinaceous cyst.  Correlation with ultrasound recommended. Prominent enlargement of the prostate. Thickening of the bladder wall,   presumably representing muscular hypertrophy. Incidental status post cholecystectomy and degenerative changes in the lower   thoracic and lumbar spine. Mild scarring in the left lung base. - US RENAL COMPLETE; Future    2. Prostate enlargement  - AFL - Cristy Myers MD, Urology, [facility]  - patient refuse to see the urology          - All old blood work reviewed with the patient  - Appropriate prescription are addressed. - After visit summery provided. - Questions answered and patient verbalizes understanding.  - Call for any problem, questions, or concerns.  - RTC if symptoms worse. Return if symptoms worsen or fail to improve.

## 2022-08-20 ASSESSMENT — ENCOUNTER SYMPTOMS
BACK PAIN: 0
COUGH: 0
CONSTIPATION: 1
ABDOMINAL PAIN: 0
VOMITING: 0
NAUSEA: 0
SHORTNESS OF BREATH: 0

## 2022-08-21 ASSESSMENT — ENCOUNTER SYMPTOMS
ABDOMINAL PAIN: 1
NAUSEA: 0
VOMITING: 0
SHORTNESS OF BREATH: 0
CONSTIPATION: 1
BACK PAIN: 0
DIARRHEA: 0
ABDOMINAL DISTENTION: 0
COUGH: 0
BLOOD IN STOOL: 0

## 2022-08-22 ENCOUNTER — HOSPITAL ENCOUNTER (OUTPATIENT)
Dept: ULTRASOUND IMAGING | Age: 87
Discharge: HOME | End: 2022-08-22
Payer: MEDICARE

## 2022-08-22 DIAGNOSIS — N28.9 KIDNEY LESION: ICD-10-CM

## 2022-08-22 PROCEDURE — 76775 US EXAM ABDO BACK WALL LIM: CPT

## 2022-08-23 ENCOUNTER — OFFICE VISIT (OUTPATIENT)
Dept: FAMILY MEDICINE CLINIC | Age: 87
End: 2022-08-23
Payer: MEDICARE

## 2022-08-23 VITALS
OXYGEN SATURATION: 97 % | HEART RATE: 85 BPM | DIASTOLIC BLOOD PRESSURE: 78 MMHG | BODY MASS INDEX: 29.05 KG/M2 | WEIGHT: 214.2 LBS | SYSTOLIC BLOOD PRESSURE: 124 MMHG

## 2022-08-23 DIAGNOSIS — N28.9 KIDNEY LESION: ICD-10-CM

## 2022-08-23 DIAGNOSIS — N40.0 PROSTATE ENLARGEMENT: Primary | ICD-10-CM

## 2022-08-23 PROCEDURE — 99213 OFFICE O/P EST LOW 20 MIN: CPT | Performed by: FAMILY MEDICINE

## 2022-08-23 PROCEDURE — 1123F ACP DISCUSS/DSCN MKR DOCD: CPT | Performed by: FAMILY MEDICINE

## 2022-08-23 NOTE — PROGRESS NOTES
Brian Lockhart  1935 08/28/22    Chief Complaint   Patient presents with    Other     Patient here for 1 week F/U regarding the groin pain, and also to discuss the us result           Patient here for 1 wk f/u regarding the groin pain, stats better, patient also to discuss the results of the us which is pending no result yet. Past Medical History:   Diagnosis Date    Abnormal PFTs (pulmonary function tests)     PFT 10/09 3.21 (98%) / 4.97 (115%)=0.65    BPH     Mod BPH 40 gm prostate- Cystourethroscopy 4/2010- Dr. Nisreen Addison    COPD, mild [facility]) 2/28/2018    Coronary artery disease involving native coronary artery of native heart with unstable angina pectoris ([address]     Depression     Pt has refused Rx in the past    DJD (degenerative joint disease), lumbar     CT scan 3/29/2010    Dyspnea on exertion 10/4/2018    Former smoker 2/28/2018    H/O echocardiogram 05/11/2018    EF 50-60%, Mild LVH, Grade 1 diastolic dysfunction, Sclerotice but non-stenotic AV. H/O echocardiogram 09/10/2018    Limited- EF 50-55%, AV sclerotic but not stenotic, visualized MR, TR, and OR. Dilated aortic root. History of nuclear stress test 08/07/2018    cardiolite-normal    Hx of cardiovascular stress test 05/11/2018    EF 52%, Abn stress images, Mild small-size lateral wall perfusion, Mod inferior wall ischemia.     Hypertension 1996    Insomnia     Kidney stones     CT scan 3/29/2010    Liver cyst     CT scan 3/29/2010    Lumbar disc disease 8/2012    Dr. Flakito Larkin, will refer to Dr. Kem Fernandez for eval    Lumbar stenosis     CT scan 3/29/2010    Mixed hyperlipidemia 3/24/2021    Nocturia     Orchitis and epididymitis, unspecified 8/2012    Dr. Nisreen Addison    Pain in left testicle     Pancreatic cyst     CT scan 3/29/2010    Spinal stenosis 8/2012    severe-Dr. Nisreen Addison    Suicide attempt [facility]) 3/2005    drug overdose with  Zestoretic    Tachycardia     \"saw  , not sure of his name, did tests and did not find anything\"\"that was about 4 months ago\"\"no more episodes since had work up\"    Ulcer     hx of ulcers\"three years ago but ok now\"(pc)     Past Surgical History:   Procedure Laterality Date    CHOLECYSTECTOMY  42 yrs ago    CYSTOSCOPY  2010    ENDOSCOPY, COLON, DIAGNOSTIC      EYE SURGERY      cataract ext carroll    HERNIA REPAIR  over 12 yrs ago    Bilateral inguinal hernia repair    PTCA      [facility]    PULMONARY STRESS TEST  3/1/12    Normal; ejection fraction 70%; exercise capacity 4.6 METS    TONSILLECTOMY       History reviewed. No pertinent family history. Social History     Socioeconomic History    Marital status: Legally      Spouse name: Not on file    Number of children: Not on file    Years of education: Not on file    Highest education level: Not on file   Occupational History    Not on file   Tobacco Use    Smoking status: Former     Packs/day: 2.50     Years: 65.00     Pack years: 162.50     Types: Cigarettes     Quit date: 2009     Years since quittin.0    Smokeless tobacco: Never    Tobacco comments:     Pt smoked for 65 yrs. Quit 9 yrs ago.  Updated 18   Substance and Sexual Activity    Alcohol use: No     Alcohol/week: 4.2 standard drinks     Types: 5 Standard drinks or equivalent per week    Drug use: Not on file    Sexual activity: Not on file   Other Topics Concern    Not on file   Social History Narrative    Not on file     Social Determinants of Health     Financial Resource Strain: Unknown    Difficulty of Paying Living Expenses: Patient refused   Food Insecurity: Unknown    Worried About Running Out of Food in the Last Year: Patient refused    Ran Out of Food in the Last Year: Patient refused   Transportation Needs: Not on file   Physical Activity: Not on file   Stress: Not on file   Social Connections: Not on file   Intimate Partner Violence: Not on file   Housing Stability: Not on file       Allergies   Allergen Reactions    Ace Inhibitors      Angioedema, / on 12- pt denies Genitourinary:  Negative for dysuria, flank pain and frequency. Musculoskeletal:  Negative for back pain. Psychiatric/Behavioral:  Negative for agitation and sleep disturbance. The patient is not nervous/anxious. Lab Results   Component Value Date    WBC 8.0 07/25/2022    HGB 16.1 07/25/2022    HCT 48.1 07/25/2022    MCV 96.2 07/25/2022     07/25/2022     Lab Results   Component Value Date     07/25/2022    K 4.6 07/25/2022     07/25/2022    CO2 25 07/25/2022    BUN 18 07/25/2022    CREATININE 1.0 07/25/2022    GLUCOSE 104 (H) 08/14/2020    CALCIUM 9.5 07/25/2022    PROT 7.3 07/25/2022    LABALBU 4.4 07/25/2022    BILITOT 0.7 07/25/2022    ALKPHOS 84 07/25/2022    AST 16 07/25/2022    ALT 14 07/25/2022    LABGLOM >60 07/25/2022    GFRAA >60 07/25/2022    AGRATIO 1.5 07/25/2022    GLOB 2.8 10/08/2021     Lab Results   Component Value Date    CHOL 193 10/08/2021    CHOL 161 02/08/2019    CHOL 212 (H) 07/16/2018     Lab Results   Component Value Date    TRIG 86 10/08/2021    TRIG 78 02/08/2019    TRIG 83 07/16/2018     Lab Results   Component Value Date    HDL 57 10/08/2021    HDL 62 02/08/2019    HDL 64 07/16/2018     Lab Results   Component Value Date    LDLCALC 119 (H) 10/08/2021    LDLCALC 131 (H) 03/02/2012     Lab Results   Component Value Date    LABA1C 5.9 12/18/2020     Lab Results   Component Value Date    TSH 4.82 (H) 10/08/2021         /78 (Site: Left Upper Arm, Position: Sitting, Cuff Size: Large Adult)   Pulse 85   Wt 214 lb 3.2 oz (97.2 kg)   SpO2 97%   BMI 29.05 kg/m²     BP Readings from Last 3 Encounters:   08/23/22 124/78   08/18/22 125/79   08/17/22 (!) 160/95       Wt Readings from Last 3 Encounters:   08/23/22 214 lb 3.2 oz (97.2 kg)   08/18/22 215 lb (97.5 kg)   08/17/22 213 lb (96.6 kg)         Physical Exam  Constitutional:       General: He is not in acute distress. Appearance: Normal appearance. He is not ill-appearing.    HENT:      Head:

## 2022-08-28 ASSESSMENT — ENCOUNTER SYMPTOMS
BACK PAIN: 0
COUGH: 0
NAUSEA: 0
CONSTIPATION: 1
SHORTNESS OF BREATH: 0
VOMITING: 0
ABDOMINAL PAIN: 0

## 2023-05-02 DIAGNOSIS — I10 ESSENTIAL HYPERTENSION: ICD-10-CM

## 2023-05-02 RX ORDER — LOSARTAN POTASSIUM 50 MG/1
50 TABLET ORAL DAILY
Qty: 90 TABLET | Refills: 3 | Status: SHIPPED | OUTPATIENT
Start: 2023-05-02

## 2023-05-02 RX ORDER — AMLODIPINE BESYLATE 10 MG/1
10 TABLET ORAL DAILY
Qty: 90 TABLET | Refills: 3 | Status: SHIPPED | OUTPATIENT
Start: 2023-05-02

## 2023-08-11 ENCOUNTER — OFFICE VISIT (OUTPATIENT)
Dept: FAMILY MEDICINE CLINIC | Age: 88
End: 2023-08-11

## 2023-08-11 VITALS
TEMPERATURE: 97.8 F | SYSTOLIC BLOOD PRESSURE: 140 MMHG | HEART RATE: 91 BPM | BODY MASS INDEX: 29.21 KG/M2 | DIASTOLIC BLOOD PRESSURE: 90 MMHG | WEIGHT: 215.4 LBS | OXYGEN SATURATION: 94 %

## 2023-08-11 DIAGNOSIS — N40.1 BENIGN PROSTATIC HYPERPLASIA WITH LOWER URINARY TRACT SYMPTOMS, SYMPTOM DETAILS UNSPECIFIED: ICD-10-CM

## 2023-08-11 RX ORDER — TAMSULOSIN HYDROCHLORIDE 0.4 MG/1
0.8 CAPSULE ORAL NIGHTLY
Qty: 60 CAPSULE | Refills: 1 | Status: SHIPPED | OUTPATIENT
Start: 2023-08-11

## 2023-08-11 RX ORDER — FINASTERIDE 5 MG/1
5 TABLET, FILM COATED ORAL DAILY
Qty: 30 TABLET | Refills: 1 | Status: SHIPPED | OUTPATIENT
Start: 2023-08-11

## 2023-08-11 ASSESSMENT — ENCOUNTER SYMPTOMS
RHINORRHEA: 0
SINUS PRESSURE: 0
COUGH: 0
SINUS PAIN: 0
VOMITING: 0
CHEST TIGHTNESS: 0
SORE THROAT: 0
WHEEZING: 0
DIARRHEA: 0
NAUSEA: 0
SHORTNESS OF BREATH: 0

## 2023-08-11 NOTE — PROGRESS NOTES
Idania Baumann   81 y.o.  male  [id number]      Chief Complaint   Patient presents with    Medication Refill        Subjective:  81 y. o.male is here for a follow up. He has the following chronic/acute medical problems:  Patient Active Problem List   Diagnosis    Urinary frequency    BPH (benign prostatic hyperplasia)    Leg swelling    Insomnia    Heel pain    Hematuria    Heartburn    ED (erectile dysfunction)    Uses Tanzanian as primary spoken language    COPD, mild ([address])    Former smoker    Mild diastolic dysfunction- EF 17%  (2015)    Coronary artery disease involving native coronary artery of native heart with unstable angina pectoris (HCC)    Precordial pain    Dyslipidemia    Essential hypertension    Thoracic aortic aneurysm without rupture (HCC) 4.1 cm- need repeat U/S Nov 2018    Abnormal cardiovascular stress test    Dyspnea on exertion    Acute nonintractable headache    Mixed hyperlipidemia    Gastroesophageal reflux disease without esophagitis       Patient is here needing medication refills - Proscar and Flomax. Patient states he is on Proscar 5 mg daily and Flomax 0.4 mg 2 capsules nightly for BPH. Review of Systems   Constitutional:  Negative for appetite change, chills, fatigue and fever. HENT:  Negative for congestion, ear pain, postnasal drip, rhinorrhea, sinus pressure, sinus pain, sneezing and sore throat. Respiratory:  Negative for cough, chest tightness, shortness of breath and wheezing. Cardiovascular:  Negative for chest pain and palpitations. Gastrointestinal:  Negative for diarrhea, nausea and vomiting. Skin:  Negative for rash. Neurological:  Negative for dizziness, light-headedness and headaches.      Current Outpatient Medications   Medication Sig Dispense Refill    tamsulosin (FLOMAX) 0.4 MG capsule Take 2 capsules by mouth nightly Please print sig in Tanzanian if available (Tanzanian speaking only patient) 60 capsule 1    finasteride (PROSCAR) 5 MG tablet Take 1 tablet

## 2024-04-22 ENCOUNTER — TELEPHONE (OUTPATIENT)
Dept: PHARMACY | Facility: CLINIC | Age: 89
End: 2024-04-22

## 2024-04-22 NOTE — TELEPHONE ENCOUNTER
[facility] CLINICAL PHARMACY: ADHERENCE REVIEW  Identified care gap per [place]: fills at [facility]: ACE/ARB adherence    Patient also appears to be prescribed: ACE/ARB    ASSESSMENT    ACE/ARB ADHERENCE    Insurance Records claims through 24 (Prior Year PDC = not reported; YTD PDC = FIRST FILL  LOSARTAN POTASSIUM 50 MG TAB last filled on 24 for 90 day supply. Next refill due: 24    Prescribed si tablet/capsule daily    Per Reconcile Dispense History: last filled on 24 for 90 day supply.     Per [facility]:  0 refills remaining.  Pharmacy will send a refill request     BP Readings from Last 3 Encounters:   23 (!) 140/90   22 124/78   22 125/79     CrCl cannot be calculated (Patient's most recent lab result is older than the maximum 180 days allowed.).  Lab Results   Component Value Date    CREATININE 1.0 2022     Lab Results   Component Value Date    K 4.6 2022       PLAN  The following are interventions that have been identified:   Patient OVERDUE refilling LOSARTAN POTASSIUM 50 MG TAB and active on home medication list.   Need to confirm if patient is taking LOSARTAN POTASSIUM 50 MG TAB differently than prescribed and need to obtain an updated order if so    Attempting to reach patient to review.  Left message asking for return call. Letter sent to patient.    Recent Visits  No visits were found meeting these conditions.  Showing recent visits within past 540 days with a meds authorizing provider and meeting all other requirements  Future Appointments  No visits were found meeting these conditions.  Showing future appointments within next 150 days with a meds authorizing provider and meeting all other requirements    No future appointments.    [facility]  [facility] // Department, toll free [phone], Option 3    For Pharmacy Admin Tracking Only    Program: [employer]  CPA in place:

## 2025-01-23 ENCOUNTER — OFFICE VISIT (OUTPATIENT)
Dept: FAMILY MEDICINE CLINIC | Age: 89
End: 2025-01-23

## 2025-01-23 VITALS
HEART RATE: 74 BPM | HEIGHT: 70 IN | RESPIRATION RATE: 16 BRPM | OXYGEN SATURATION: 95 % | WEIGHT: 204.8 LBS | SYSTOLIC BLOOD PRESSURE: 170 MMHG | DIASTOLIC BLOOD PRESSURE: 100 MMHG | BODY MASS INDEX: 29.32 KG/M2

## 2025-01-23 DIAGNOSIS — Z76.89 ENCOUNTER TO ESTABLISH CARE: Primary | ICD-10-CM

## 2025-01-23 DIAGNOSIS — Z13.0 SCREENING FOR DEFICIENCY ANEMIA: ICD-10-CM

## 2025-01-23 DIAGNOSIS — I51.9 MILD DIASTOLIC DYSFUNCTION: ICD-10-CM

## 2025-01-23 DIAGNOSIS — I71.20 THORACIC AORTIC ANEURYSM WITHOUT RUPTURE, UNSPECIFIED PART (HCC): ICD-10-CM

## 2025-01-23 DIAGNOSIS — I10 ESSENTIAL HYPERTENSION: ICD-10-CM

## 2025-01-23 DIAGNOSIS — N40.1 BENIGN PROSTATIC HYPERPLASIA WITH LOWER URINARY TRACT SYMPTOMS, SYMPTOM DETAILS UNSPECIFIED: ICD-10-CM

## 2025-01-23 DIAGNOSIS — R35.0 URINARY FREQUENCY: ICD-10-CM

## 2025-01-23 DIAGNOSIS — Z78.9 USES SPANISH AS PRIMARY SPOKEN LANGUAGE: ICD-10-CM

## 2025-01-23 DIAGNOSIS — Z75.8 SPANISH LANGUAGE INTERPRETER NEEDED: ICD-10-CM

## 2025-01-23 DIAGNOSIS — J44.9 COPD, MILD (HCC): ICD-10-CM

## 2025-01-23 DIAGNOSIS — I83.91 VARICOSE VEINS OF RIGHT LOWER EXTREMITY, UNSPECIFIED WHETHER COMPLICATED: ICD-10-CM

## 2025-01-23 DIAGNOSIS — Z13.29 SCREENING FOR THYROID DISORDER: ICD-10-CM

## 2025-01-23 DIAGNOSIS — R32 URINARY INCONTINENCE, UNSPECIFIED TYPE: ICD-10-CM

## 2025-01-23 DIAGNOSIS — I25.110 CORONARY ARTERY DISEASE INVOLVING NATIVE CORONARY ARTERY OF NATIVE HEART WITH UNSTABLE ANGINA PECTORIS (HCC): ICD-10-CM

## 2025-01-23 DIAGNOSIS — M79.89 LEG SWELLING: ICD-10-CM

## 2025-01-23 DIAGNOSIS — K21.9 GASTROESOPHAGEAL REFLUX DISEASE WITHOUT ESOPHAGITIS: ICD-10-CM

## 2025-01-23 PROBLEM — R51.9 ACUTE NONINTRACTABLE HEADACHE: Status: RESOLVED | Noted: 2020-09-20 | Resolved: 2025-01-23

## 2025-01-23 PROBLEM — E78.5 DYSLIPIDEMIA: Status: RESOLVED | Noted: 2018-04-30 | Resolved: 2025-01-23

## 2025-01-23 PROBLEM — R07.2 PRECORDIAL PAIN: Status: RESOLVED | Noted: 2018-04-30 | Resolved: 2025-01-23

## 2025-01-23 PROBLEM — R06.09 DYSPNEA ON EXERTION: Status: RESOLVED | Noted: 2018-10-04 | Resolved: 2025-01-23

## 2025-01-23 RX ORDER — LOSARTAN POTASSIUM 50 MG/1
50 TABLET ORAL DAILY
Qty: 90 TABLET | Refills: 0 | Status: SHIPPED | OUTPATIENT
Start: 2025-01-23

## 2025-01-23 RX ORDER — TAMSULOSIN HYDROCHLORIDE 0.4 MG/1
0.8 CAPSULE ORAL NIGHTLY
Qty: 60 CAPSULE | Refills: 1 | Status: SHIPPED | OUTPATIENT
Start: 2025-01-23

## 2025-01-23 RX ORDER — CARVEDILOL 6.25 MG/1
3.12 TABLET ORAL 2 TIMES DAILY
Qty: 90 TABLET | Refills: 0 | Status: SHIPPED | OUTPATIENT
Start: 2025-01-23

## 2025-01-23 RX ORDER — FINASTERIDE 5 MG/1
5 TABLET, FILM COATED ORAL DAILY
Qty: 30 TABLET | Refills: 1 | Status: SHIPPED | OUTPATIENT
Start: 2025-01-23

## 2025-01-23 RX ORDER — AMLODIPINE BESYLATE 10 MG/1
10 TABLET ORAL DAILY
Qty: 90 TABLET | Refills: 0 | Status: SHIPPED | OUTPATIENT
Start: 2025-01-23

## 2025-01-23 ASSESSMENT — PATIENT HEALTH QUESTIONNAIRE - PHQ9
SUM OF ALL RESPONSES TO PHQ QUESTIONS 1-9: 0
2. FEELING DOWN, DEPRESSED OR HOPELESS: NOT AT ALL
SUM OF ALL RESPONSES TO PHQ QUESTIONS 1-9: 0
1. LITTLE INTEREST OR PLEASURE IN DOING THINGS: NOT AT ALL
SUM OF ALL RESPONSES TO PHQ QUESTIONS 1-9: 0
SUM OF ALL RESPONSES TO PHQ QUESTIONS 1-9: 0
SUM OF ALL RESPONSES TO PHQ9 QUESTIONS 1 & 2: 0

## 2025-01-23 NOTE — ASSESSMENT & PLAN NOTE
Proscar was refilled today.  He has not had this for several months.  Does not want to follow-up with urology at this time but would like to restart his medications.  Labs will be ordered today

## 2025-01-23 NOTE — PROGRESS NOTES
Medications Discontinued During This Encounter   Medication Reason    DULoxetine (CYMBALTA) 20 MG extended release capsule LIST CLEANUP    carvedilol (COREG) 6.25 MG tablet REORDER    amLODIPine (NORVASC) 10 MG tablet REORDER    losartan (COZAAR) 50 MG tablet REORDER    tamsulosin (FLOMAX) 0.4 MG capsule REORDER    finasteride (PROSCAR) 5 MG tablet REORDER       Orders Placed This Encounter   Procedures    LIPID PANEL    Comprehensive Metabolic Panel    CBC with Auto Differential    TSH reflex to FT4    PSA Screening    Dana Ruano, UQEENIE, Cardiology, [facility] discussed with patient. Questions answered. Patient verbalizes understanding and agrees with plan.   After visit summary provided.   Advised to call for any problems, questions, or concerns.      Return for HTN follow up. 1 month     Controlled Substances Monitoring: (if applicable to pt visit)                                         Signed:  ERIS Miller - CNP  01/23/25  2:12 PM

## 2025-01-23 NOTE — ASSESSMENT & PLAN NOTE
Denies current chest pain.  He has been without any medications for 2 years.  He is interested in following up with cardiology

## 2025-01-23 NOTE — ASSESSMENT & PLAN NOTE
No current concerns.  He has been without medication for several years.  Denies any shortness of breath today.  Able to speak in full sentences

## 2025-01-23 NOTE — ASSESSMENT & PLAN NOTE
Reports varicose veins, stasis dermatitis.  No obvious open areas, no redness.  He does have very large varicose veins worse on the right than the left would like to be referred to see about improving this problem

## 2025-01-23 NOTE — ASSESSMENT & PLAN NOTE
reports that he is occasionally leaking urine.  He has seen Dr. Gilliam in the past but is reluctant to return due to painful procedures.  He would like to restart the medications that he had been on to see if the condition improves he will follow-up in a month

## 2025-01-23 NOTE — ASSESSMENT & PLAN NOTE
Blood pressure is extremely elevated today.  The patient has not had blood pressure medications in 2 years.  He reports that he has had great difficulty in finding a healthcare provider that would take care of him because he speaks Sinhala and is .  He would like to restart his medication today.  He is also interested in following up with cardiology

## 2025-01-23 NOTE — ASSESSMENT & PLAN NOTE
Patient has not followed up on this for a couple of years.  He does not remember the last time that he saw anyone for this.  He denies any problems, no abdominal pain, no chest pain

## 2025-01-24 DIAGNOSIS — I25.110 CORONARY ARTERY DISEASE INVOLVING NATIVE CORONARY ARTERY OF NATIVE HEART WITH UNSTABLE ANGINA PECTORIS (HCC): ICD-10-CM

## 2025-01-24 LAB
ALBUMIN SERPL-MCNC: 4.7 G/DL (ref 3.4–5)
ALBUMIN/GLOB SERPL: 1.7 {RATIO} (ref 1.1–2.2)
ALP SERPL-CCNC: 69 U/L (ref 40–129)
ALT SERPL-CCNC: 13 U/L (ref 10–40)
ANION GAP SERPL CALCULATED.3IONS-SCNC: 10 MMOL/L (ref 3–16)
AST SERPL-CCNC: 21 U/L (ref 15–37)
BASOPHILS # BLD: 0.1 K/UL (ref 0–0.2)
BASOPHILS NFR BLD: 1.3 %
BILIRUB SERPL-MCNC: 0.6 MG/DL (ref 0–1)
BUN SERPL-MCNC: 26 MG/DL (ref 7–20)
CALCIUM SERPL-MCNC: 9.9 MG/DL (ref 8.3–10.6)
CHLORIDE SERPL-SCNC: 102 MMOL/L (ref 99–110)
CHOLEST SERPL-MCNC: 236 MG/DL (ref 0–199)
CO2 SERPL-SCNC: 26 MMOL/L (ref 21–32)
CREAT SERPL-MCNC: 1.3 MG/DL (ref 0.8–1.3)
DEPRECATED RDW RBC AUTO: 14 % (ref 12.4–15.4)
EOSINOPHIL # BLD: 0.1 K/UL (ref 0–0.6)
EOSINOPHIL NFR BLD: 2.2 %
GFR SERPLBLD CREATININE-BSD FMLA CKD-EPI: 53 ML/MIN/{1.73_M2}
GLUCOSE SERPL-MCNC: 87 MG/DL (ref 70–99)
HCT VFR BLD AUTO: 46.7 % (ref 40.5–52.5)
HDLC SERPL-MCNC: 69 MG/DL (ref 40–60)
HGB BLD-MCNC: 15.8 G/DL (ref 13.5–17.5)
LDLC SERPL CALC-MCNC: 146 MG/DL
LYMPHOCYTES # BLD: 1.1 K/UL (ref 1–5.1)
LYMPHOCYTES NFR BLD: 17.9 %
MCH RBC QN AUTO: 32.1 PG (ref 26–34)
MCHC RBC AUTO-ENTMCNC: 33.9 G/DL (ref 31–36)
MCV RBC AUTO: 94.8 FL (ref 80–100)
MONOCYTES # BLD: 0.7 K/UL (ref 0–1.3)
MONOCYTES NFR BLD: 11.4 %
NEUTROPHILS # BLD: 4 K/UL (ref 1.7–7.7)
NEUTROPHILS NFR BLD: 67.2 %
PLATELET # BLD AUTO: 227 K/UL (ref 135–450)
PMV BLD AUTO: 9.9 FL (ref 5–10.5)
POTASSIUM SERPL-SCNC: 4.7 MMOL/L (ref 3.5–5.1)
PROT SERPL-MCNC: 7.5 G/DL (ref 6.4–8.2)
PSA SERPL DL<=0.01 NG/ML-MCNC: 1.44 NG/ML (ref 0–4)
RBC # BLD AUTO: 4.93 M/UL (ref 4.2–5.9)
SODIUM SERPL-SCNC: 138 MMOL/L (ref 136–145)
T4 FREE SERPL-MCNC: 1.1 NG/DL (ref 0.9–1.8)
TRIGL SERPL-MCNC: 107 MG/DL (ref 0–150)
TSH SERPL DL<=0.005 MIU/L-ACNC: 5.3 UIU/ML (ref 0.27–4.2)
VLDLC SERPL CALC-MCNC: 21 MG/DL
WBC # BLD AUTO: 5.9 K/UL (ref 4–11)

## 2025-01-24 RX ORDER — SIMVASTATIN 40 MG
40 TABLET ORAL NIGHTLY
Qty: 90 TABLET | Refills: 3 | Status: SHIPPED | OUTPATIENT
Start: 2025-01-24

## 2025-01-24 RX ORDER — ASPIRIN 81 MG/1
81 TABLET, CHEWABLE ORAL DAILY
Qty: 90 TABLET | Refills: 3 | Status: SHIPPED | OUTPATIENT
Start: 2025-01-24

## 2025-01-24 RX ORDER — LEVOTHYROXINE SODIUM 25 UG/1
25 TABLET ORAL DAILY
Qty: 90 TABLET | Refills: 0 | Status: SHIPPED | OUTPATIENT
Start: 2025-01-24

## 2025-01-31 ENCOUNTER — TELEPHONE (OUTPATIENT)
Dept: CARDIOLOGY CLINIC | Age: 89
End: 2025-01-31

## 2025-01-31 NOTE — TELEPHONE ENCOUNTER
With the assistance of a  I was able to leave a VM for PT to call back and schedule a consult visit with the first available provider.     Referral by Izabella Castelan.   DX: See Referral     1st attempt to contact patient.

## 2025-02-07 ENCOUNTER — TELEPHONE (OUTPATIENT)
Dept: FAMILY MEDICINE CLINIC | Age: 89
End: 2025-02-07

## 2025-02-07 NOTE — TELEPHONE ENCOUNTER
Patient came into the office after receiving a letter to contact the office. Patient was given lab results via the assistance of  [id number] and advised of medication sent to the pharmacy for him. Expressed verbal understanding. Patient was also scheduled for appointment with Dr. Acosta, Cardiology, for 2/25/25 at 1:30 PM after walking Mr Bentley to Cardiology office. Patient was given card with appointment date and time.

## 2025-02-18 NOTE — PROGRESS NOTES
Patient Name: Kan Bentley  : 1935  MRN# [id number]    REASON FOR VISIT:  CONSULT  Patient Active Problem List    Diagnosis Date Noted    Urinary incontinence 2025    Varicose veins of right lower extremity 2025    Bangladeshi  needed 2025    Mixed hyperlipidemia 2021    Gastroesophageal reflux disease without esophagitis 2021    Thoracic aortic aneurysm without rupture (HCC) 4.1 cm- need repeat U/S 2018    Abnormal cardiovascular stress test 2018    Essential hypertension 2018    Coronary artery disease involving native coronary artery of native heart with unstable angina pectoris (HCC) 04/10/2018    Mild diastolic dysfunction- EF 55%  () 2018    COPD, mild () 2018    Former smoker 2018    Uses Bangladeshi as primary spoken language 2018    ED (erectile dysfunction) 12/15/2014    Leg swelling 2012    Urinary frequency 2011    BPH (benign prostatic hyperplasia) 2011     CURRENT SX:     Chest Pain :    When did it begin:    How long does it last:    How severe 1-10:    Radiation:    Aggravating factors:    Relieving factors:    Associated features:    Tenderness to palp:    Shortness of Breath:    When did it start:    How many flights of stairs can they climb without SOB:    Associated features:    Orthopena; how many pillows do they sleep with under your head :    Dizziness    Palpitations:    When did it begin:    How often do they occur:    How long do they last:    Aggravating factors:    Relieving factors:    Associated features:    Any thyroid issues:    How much caffeine:    Edema    Do you Exercise??    LABS:  Lab Results   Component Value Date    CHOL 236 (H) 2025    TRIG 107 2025    HDL 69 (H) 2025    LDLDIRECT 93 2019    TSH 4.82 (H) 10/08/2021     Hemoglobin A1C   Date Value Ref Range Status   2020 5.9 % Final

## 2025-02-24 ENCOUNTER — OFFICE VISIT (OUTPATIENT)
Dept: FAMILY MEDICINE CLINIC | Age: 89
End: 2025-02-24

## 2025-02-24 VITALS
HEART RATE: 76 BPM | BODY MASS INDEX: 29.92 KG/M2 | OXYGEN SATURATION: 97 % | DIASTOLIC BLOOD PRESSURE: 70 MMHG | RESPIRATION RATE: 16 BRPM | WEIGHT: 202 LBS | HEIGHT: 69 IN | SYSTOLIC BLOOD PRESSURE: 138 MMHG

## 2025-02-24 DIAGNOSIS — N40.1 BENIGN PROSTATIC HYPERPLASIA WITH LOWER URINARY TRACT SYMPTOMS, SYMPTOM DETAILS UNSPECIFIED: ICD-10-CM

## 2025-02-24 DIAGNOSIS — F51.01 PRIMARY INSOMNIA: ICD-10-CM

## 2025-02-24 DIAGNOSIS — Z75.8 SPANISH LANGUAGE INTERPRETER NEEDED: ICD-10-CM

## 2025-02-24 DIAGNOSIS — K59.00 CONSTIPATION, UNSPECIFIED CONSTIPATION TYPE: Primary | ICD-10-CM

## 2025-02-24 DIAGNOSIS — Z78.9 USES SPANISH AS PRIMARY SPOKEN LANGUAGE: ICD-10-CM

## 2025-02-24 DIAGNOSIS — I10 ESSENTIAL HYPERTENSION: ICD-10-CM

## 2025-02-24 RX ORDER — MIRTAZAPINE 7.5 MG/1
7.5 TABLET, FILM COATED ORAL NIGHTLY
Qty: 90 TABLET | Refills: 1 | Status: SHIPPED | OUTPATIENT
Start: 2025-02-24

## 2025-02-24 SDOH — ECONOMIC STABILITY: FOOD INSECURITY: WITHIN THE PAST 12 MONTHS, YOU WORRIED THAT YOUR FOOD WOULD RUN OUT BEFORE YOU GOT MONEY TO BUY MORE.: NEVER TRUE

## 2025-02-24 SDOH — ECONOMIC STABILITY: FOOD INSECURITY: WITHIN THE PAST 12 MONTHS, THE FOOD YOU BOUGHT JUST DIDN'T LAST AND YOU DIDN'T HAVE MONEY TO GET MORE.: NEVER TRUE

## 2025-02-24 ASSESSMENT — PATIENT HEALTH QUESTIONNAIRE - PHQ9
2. FEELING DOWN, DEPRESSED OR HOPELESS: NOT AT ALL
SUM OF ALL RESPONSES TO PHQ QUESTIONS 1-9: 0
SUM OF ALL RESPONSES TO PHQ QUESTIONS 1-9: 0
1. LITTLE INTEREST OR PLEASURE IN DOING THINGS: NOT AT ALL
SUM OF ALL RESPONSES TO PHQ9 QUESTIONS 1 & 2: 0
SUM OF ALL RESPONSES TO PHQ QUESTIONS 1-9: 0
SUM OF ALL RESPONSES TO PHQ QUESTIONS 1-9: 0

## 2025-02-24 NOTE — PROGRESS NOTES
Kan Bentley   89 y.o.  male  [id number]      Chief Complaint   Patient presents with    Medicare AWV    Discuss Medications     Patient c/o BP medication upsetting his stomach and inducing vomiting        Subjective:  89 y.o.male is here for an office visit. He has the following chronic/acute medical problems:  Patient Active Problem List   Diagnosis    Urinary frequency    BPH (benign prostatic hyperplasia)    Leg swelling    ED (erectile dysfunction)    Uses Ethiopian as primary spoken language    COPD, mild ([facility])    Former smoker    Mild diastolic dysfunction- EF 55%  (2015)    Coronary artery disease involving native coronary artery of native heart with unstable angina pectoris ([facility])    Essential hypertension    Thoracic aortic aneurysm without rupture ([facility]) 4.1 cm- need repeat U/S Nov 2018    Abnormal cardiovascular stress test    Mixed hyperlipidemia    Gastroesophageal reflux disease without esophagitis    Urinary incontinence    Varicose veins of right lower extremity    Eth[place]ian  needed       HPI having problems of constipation   Wants to review all medications w interpretper  Attempted AWV but not able with patient and  relay. Will try again at future visit.   Having some stomach pain when having constipation.     Otherwise is feeling well.   Having some incontinence at night. Does not feel that the medication for BPH is helping.  He absolutely does not want to see urology again due to painful procedures that were done the last time he was there.    Blood pressure is improved.  No chest pain shortness of breath or other concerning symptoms    Reports that he is not sleeping well and would like to try something to help him sleep better at night    Review of Systems   Constitutional:  Negative for chills, diaphoresis and fever.   Respiratory:  Negative for cough, shortness of breath and wheezing.    Cardiovascular:  Negative for chest pain, palpitations and leg swelling.

## 2025-02-25 ASSESSMENT — ENCOUNTER SYMPTOMS
SHORTNESS OF BREATH: 0
COUGH: 0
WHEEZING: 0

## 2025-04-11 DIAGNOSIS — I10 ESSENTIAL HYPERTENSION: ICD-10-CM

## 2025-04-11 RX ORDER — LOSARTAN POTASSIUM 50 MG/1
50 TABLET ORAL DAILY
Qty: 90 TABLET | Refills: 0 | Status: SHIPPED | OUTPATIENT
Start: 2025-04-11

## 2025-04-11 RX ORDER — AMLODIPINE BESYLATE 10 MG/1
10 TABLET ORAL DAILY
Qty: 90 TABLET | Refills: 0 | Status: SHIPPED | OUTPATIENT
Start: 2025-04-11

## 2025-05-15 DIAGNOSIS — I10 ESSENTIAL HYPERTENSION: ICD-10-CM

## 2025-05-15 RX ORDER — CARVEDILOL 6.25 MG/1
TABLET ORAL
Qty: 90 TABLET | Refills: 0 | Status: SHIPPED | OUTPATIENT
Start: 2025-05-15

## 2025-05-15 NOTE — TELEPHONE ENCOUNTER
Medication:   Requested Prescriptions     Pending Prescriptions Disp Refills    carvedilol (COREG) 6.25 MG tablet [Pharmacy Med Name: Carvedilol Oral Tablet 6.25 MG] 90 tablet 0     Sig: TAKE 1/2 TABLET BY MOUTH 2 TIMES A DAY        Last Filled:      Patient Phone Number: [phone] (home)     Last appt: 2/24/2025   Next appt: 5/20/2025    Last OARRS:        No data to display

## 2025-05-20 ENCOUNTER — OFFICE VISIT (OUTPATIENT)
Dept: FAMILY MEDICINE CLINIC | Age: 89
End: 2025-05-20
Payer: MEDICARE

## 2025-05-20 VITALS
SYSTOLIC BLOOD PRESSURE: 138 MMHG | DIASTOLIC BLOOD PRESSURE: 70 MMHG | HEART RATE: 80 BPM | WEIGHT: 205.4 LBS | RESPIRATION RATE: 18 BRPM | BODY MASS INDEX: 30.42 KG/M2 | TEMPERATURE: 98.1 F | OXYGEN SATURATION: 96 % | HEIGHT: 69 IN

## 2025-05-20 DIAGNOSIS — E03.9 HYPOTHYROIDISM, UNSPECIFIED TYPE: ICD-10-CM

## 2025-05-20 DIAGNOSIS — Z75.8 SPANISH LANGUAGE INTERPRETER NEEDED: ICD-10-CM

## 2025-05-20 DIAGNOSIS — I10 ESSENTIAL HYPERTENSION: Primary | ICD-10-CM

## 2025-05-20 DIAGNOSIS — E78.2 MIXED HYPERLIPIDEMIA: ICD-10-CM

## 2025-05-20 DIAGNOSIS — Z78.9 USES SPANISH AS PRIMARY SPOKEN LANGUAGE: ICD-10-CM

## 2025-05-20 DIAGNOSIS — F51.01 PRIMARY INSOMNIA: ICD-10-CM

## 2025-05-20 DIAGNOSIS — I25.110 CORONARY ARTERY DISEASE INVOLVING NATIVE CORONARY ARTERY OF NATIVE HEART WITH UNSTABLE ANGINA PECTORIS (HCC): ICD-10-CM

## 2025-05-20 PROBLEM — E78.5 DYSLIPIDEMIA: Status: ACTIVE | Noted: 2018-07-16

## 2025-05-20 PROCEDURE — 99214 OFFICE O/P EST MOD 30 MIN: CPT | Performed by: NURSE PRACTITIONER

## 2025-05-20 PROCEDURE — 1160F RVW MEDS BY RX/DR IN RCRD: CPT | Performed by: NURSE PRACTITIONER

## 2025-05-20 PROCEDURE — 1123F ACP DISCUSS/DSCN MKR DOCD: CPT | Performed by: NURSE PRACTITIONER

## 2025-05-20 PROCEDURE — 1159F MED LIST DOCD IN RCRD: CPT | Performed by: NURSE PRACTITIONER

## 2025-05-20 RX ORDER — LOSARTAN POTASSIUM 50 MG/1
50 TABLET ORAL DAILY
Qty: 90 TABLET | Refills: 1 | Status: SHIPPED | OUTPATIENT
Start: 2025-05-20

## 2025-05-20 RX ORDER — TRAZODONE HYDROCHLORIDE 50 MG/1
TABLET ORAL
Qty: 180 TABLET | Refills: 1 | Status: SHIPPED | OUTPATIENT
Start: 2025-05-20

## 2025-05-20 RX ORDER — LEVOTHYROXINE SODIUM 25 UG/1
25 TABLET ORAL DAILY
Qty: 90 TABLET | Refills: 1 | Status: SHIPPED | OUTPATIENT
Start: 2025-05-20

## 2025-05-20 RX ORDER — SIMVASTATIN 40 MG
40 TABLET ORAL NIGHTLY
Qty: 90 TABLET | Refills: 3 | Status: SHIPPED | OUTPATIENT
Start: 2025-05-20

## 2025-05-20 RX ORDER — CARVEDILOL 6.25 MG/1
3.12 TABLET ORAL 2 TIMES DAILY
Qty: 180 TABLET | Refills: 1 | Status: SHIPPED | OUTPATIENT
Start: 2025-05-20

## 2025-05-20 RX ORDER — AMLODIPINE BESYLATE 10 MG/1
10 TABLET ORAL DAILY
Qty: 90 TABLET | Refills: 1 | Status: SHIPPED | OUTPATIENT
Start: 2025-05-20

## 2025-05-20 ASSESSMENT — PATIENT HEALTH QUESTIONNAIRE - PHQ9
1. LITTLE INTEREST OR PLEASURE IN DOING THINGS: NOT AT ALL
SUM OF ALL RESPONSES TO PHQ QUESTIONS 1-9: 1
SUM OF ALL RESPONSES TO PHQ QUESTIONS 1-9: 1
2. FEELING DOWN, DEPRESSED OR HOPELESS: SEVERAL DAYS
SUM OF ALL RESPONSES TO PHQ QUESTIONS 1-9: 1
SUM OF ALL RESPONSES TO PHQ QUESTIONS 1-9: 1

## 2025-05-20 ASSESSMENT — ENCOUNTER SYMPTOMS
GASTROINTESTINAL NEGATIVE: 1
RESPIRATORY NEGATIVE: 1
SHORTNESS OF BREATH: 0
COUGH: 0
WHEEZING: 0
ABDOMINAL PAIN: 0

## 2025-05-20 NOTE — PROGRESS NOTES
(Medical): No     Lack of Transportation (Non-Medical): No   Housing Stability: Low Risk  (2/24/2025)    Housing Stability Vital Sign     Unable to Pay for Housing in the Last Year: No     Number of Times Moved in the Last Year: 0     Homeless in the Last Year: No        SURGICAL HISTORY  Past Surgical History:   Procedure Laterality Date    CHOLECYSTECTOMY  42 yrs ago    CYSTOSCOPY  4/2010    ENDOSCOPY, COLON, DIAGNOSTIC      EYE SURGERY      cataract ext carroll    HERNIA REPAIR  over 12 yrs ago    Bilateral inguinal hernia repair    PTCA  717/2018    [facility]    PULMONARY STRESS TEST  3/1/12    Normal; ejection fraction 70%; exercise capacity 4.6 METS    TONSILLECTOMY         CURRENT MEDICATIONS  Current Outpatient Medications   Medication Sig Dispense Refill    amLODIPine (NORVASC) 10 MG tablet Take 1 tablet by mouth daily 90 tablet 1    carvedilol (COREG) 6.25 MG tablet Take 0.5 tablets by mouth 2 times daily 180 tablet 1    simvastatin (ZOCOR) 40 MG tablet Take 1 tablet by mouth nightly 90 tablet 3    losartan (COZAAR) 50 MG tablet Take 1 tablet by mouth daily 90 tablet 1    levothyroxine (SYNTHROID) 25 MCG tablet Take 1 tablet by mouth daily Sig in Kyr[place]z please 90 tablet 1    traZODone (DESYREL) 50 MG tablet Take 1-2 tabs at night for sleep (in Kyr[place]z please) 180 tablet 1    aspirin 81 MG chewable tablet Take 1 tablet by mouth daily 90 tablet 3    Compression Stockings MISC by Does not apply route 1 each 0    nitroGLYCERIN (NITROSTAT) 0.4 MG SL tablet Place 1 tablet under the tongue every 5 minutes as needed for Chest pain (Patient not taking: Reported on 5/20/2025) 25 tablet 3     No current facility-administered medications for this visit.       ALLERGIES  Allergies   Allergen Reactions    Ace Inhibitors      Angioedema, 8/05/ (on 9/26/12- pt denies any allergic reaction to any medications)       PHYSICAL EXAM    /70   Pulse 80   Temp 98.1 °F (36.7 °C) (Oral)   Resp 18   Ht 1.753 m (5' 9\")   Wt 93.2

## 2025-08-07 DIAGNOSIS — I10 ESSENTIAL HYPERTENSION: ICD-10-CM

## 2025-08-07 RX ORDER — AMLODIPINE BESYLATE 10 MG/1
10 TABLET ORAL DAILY
Qty: 90 TABLET | Refills: 1 | Status: SHIPPED | OUTPATIENT
Start: 2025-08-07

## 2025-08-07 RX ORDER — CARVEDILOL 6.25 MG/1
3.12 TABLET ORAL 2 TIMES DAILY
Qty: 180 TABLET | Refills: 1 | Status: SHIPPED | OUTPATIENT
Start: 2025-08-07

## 2025-08-07 RX ORDER — LOSARTAN POTASSIUM 50 MG/1
50 TABLET ORAL DAILY
Qty: 90 TABLET | Refills: 1 | Status: SHIPPED | OUTPATIENT
Start: 2025-08-07